# Patient Record
Sex: FEMALE | Race: BLACK OR AFRICAN AMERICAN | NOT HISPANIC OR LATINO | Employment: UNEMPLOYED | URBAN - NONMETROPOLITAN AREA
[De-identification: names, ages, dates, MRNs, and addresses within clinical notes are randomized per-mention and may not be internally consistent; named-entity substitution may affect disease eponyms.]

---

## 2016-08-09 LAB
CBC, PLATELET CT, AND DIFF: (no result)
EXTERNAL ABO GROUPING: (no result)
EXTERNAL ANTIBODY SCREEN: NORMAL
EXTERNAL HEPATITIS B SURFACE ANTIGEN: NEGATIVE
EXTERNAL RH FACTOR: POSITIVE
EXTERNAL RUBELLA QUALITATIVE: (no result)
EXTERNAL SYPHILIS RPR SCREEN: (no result)
HIV1 AB SPEC QL IA.RAPID: NEGATIVE

## 2016-10-31 LAB
EXTERNAL GTT 1 HOUR: 125
EXTERNAL HEMATOCRIT: 34 %
EXTERNAL HEMOGLOBIN: 11.6 G/DL

## 2017-01-04 VITALS
BODY MASS INDEX: 42.6 KG/M2 | WEIGHT: 217 LBS | HEIGHT: 60 IN | SYSTOLIC BLOOD PRESSURE: 122 MMHG | DIASTOLIC BLOOD PRESSURE: 62 MMHG

## 2017-01-04 PROBLEM — Z34.90 PREGNANCY: Status: ACTIVE | Noted: 2017-01-04

## 2017-01-04 RX ORDER — PROMETHAZINE HYDROCHLORIDE 25 MG/1
25 TABLET ORAL EVERY 6 HOURS PRN
COMMUNITY
End: 2017-03-08

## 2017-01-06 ENCOUNTER — CONVERSION ENCOUNTER (OUTPATIENT)
Dept: OBSTETRICS AND GYNECOLOGY | Facility: CLINIC | Age: 22
End: 2017-01-06

## 2017-01-06 ENCOUNTER — ROUTINE PRENATAL (OUTPATIENT)
Dept: OBSTETRICS AND GYNECOLOGY | Facility: CLINIC | Age: 22
End: 2017-01-06

## 2017-01-06 VITALS — SYSTOLIC BLOOD PRESSURE: 116 MMHG | WEIGHT: 217 LBS | DIASTOLIC BLOOD PRESSURE: 70 MMHG | BODY MASS INDEX: 42.38 KG/M2

## 2017-01-06 DIAGNOSIS — A49.1 GROUP B STREPTOCOCCAL INFECTION: Primary | ICD-10-CM

## 2017-01-06 DIAGNOSIS — Z3A.36 36 WEEKS GESTATION OF PREGNANCY: ICD-10-CM

## 2017-01-06 PROCEDURE — 99212 OFFICE O/P EST SF 10 MIN: CPT | Performed by: OBSTETRICS & GYNECOLOGY

## 2017-01-06 NOTE — MR AVS SNAPSHOT
Bessie Westfall   1/6/2017 3:00 PM   Routine Prenatal    Dept Phone:  [phone]   Encounter #:  [id number]    Provider:  Micky Andino MD   Department:  [facility] OBSTETRICS AND GYNECOLOGY                Your Full Care Plan              Your Updated Medication List          This list is accurate as of: 1/6/17  3:56 PM.  Always use your most recent med list.                CLARITIN-D 12 HOUR 5-120 MG per 12 hr tablet   Generic drug:  loratadine-pseudoephedrine       hydrocortisone 2.5 % cream       promethazine 25 MG tablet   Commonly known as:  PHENERGAN               We Performed the Following     Group B Strep Culture       You Were Diagnosed With        Codes Comments    Group B streptococcal infection    -  Primary ICD-10-CM: A49.1  ICD-9-CM: 041.02     36 weeks gestation of pregnancy     ICD-10-CM: Z3A.36  ICD-9-CM: V22.2       Instructions    Third Trimester of Pregnancy  The third trimester is from week 29 through week 42, months 7 through 9. The third trimester is a time when the fetus is growing rapidly. At the end of the ninth month, the fetus is about 20 inches in length and weighs 6-10 pounds.   BODY CHANGES  Your body goes through many changes during pregnancy. The changes vary from woman to woman.   · Your weight will continue to increase. You can expect to gain 25-35 pounds (11-16 kg) by the end of the pregnancy.  · You may begin to get stretch marks on your hips, abdomen, and breasts.  · You may urinate more often because the fetus is moving lower into your pelvis and pressing on your bladder.  · You may develop or continue to have heartburn as a result of your pregnancy.  · You may develop constipation because certain hormones are causing the muscles that push waste through your intestines to slow down.  · You may develop hemorrhoids or swollen, bulging veins (varicose veins).  · You may have pelvic pain because of the weight gain and  "pregnancy hormones relaxing your joints between the bones in your pelvis. Backaches may result from overexertion of the muscles supporting your posture.  · You may have changes in your hair. These can include thickening of your hair, rapid growth, and changes in texture. Some women also have hair loss during or after pregnancy, or hair that feels dry or thin. Your hair will most likely return to normal after your baby is born.  · Your breasts will continue to grow and be tender. A yellow discharge may leak from your breasts called colostrum.  · Your belly button may stick out.  · You may feel short of breath because of your expanding uterus.  · You may notice the fetus \"dropping,\" or moving lower in your abdomen.  · You may have a bloody mucus discharge. This usually occurs a few days to a week before labor begins.  · Your cervix becomes thin and soft (effaced) near your due date.  WHAT TO EXPECT AT YOUR PRENATAL EXAMS   You will have prenatal exams every 2 weeks until week 36. Then, you will have weekly prenatal exams. During a routine prenatal visit:  · You will be weighed to make sure you and the fetus are growing normally.  · Your blood pressure is taken.  · Your abdomen will be measured to track your baby's growth.  · The fetal heartbeat will be listened to.  · Any test results from the previous visit will be discussed.  · You may have a cervical check near your due date to see if you have effaced.  At around 36 weeks, your caregiver will check your cervix. At the same time, your caregiver will also perform a test on the secretions of the vaginal tissue. This test is to determine if a type of bacteria, Group B streptococcus, is present. Your caregiver will explain this further.  Your caregiver may ask you:  · What your birth plan is.  · How you are feeling.  · If you are feeling the baby move.  · If you have had any abnormal symptoms, such as leaking fluid, bleeding, severe headaches, or abdominal " cramping.  · If you are using any tobacco products, including cigarettes, chewing tobacco, and electronic cigarettes.  · If you have any questions.  Other tests or screenings that may be performed during your third trimester include:  · Blood tests that check for low iron levels (anemia).  · Fetal testing to check the health, activity level, and growth of the fetus. Testing is done if you have certain medical conditions or if there are problems during the pregnancy.  · HIV (human immunodeficiency virus) testing. If you are at high risk, you may be screened for HIV during your third trimester of pregnancy.  FALSE LABOR  You may feel small, irregular contractions that eventually go away. These are called Roney Ratliff contractions, or false labor. Contractions may last for hours, days, or even weeks before true labor sets in. If contractions come at regular intervals, intensify, or become painful, it is best to be seen by your caregiver.   SIGNS OF LABOR   · Menstrual-like cramps.  · Contractions that are 5 minutes apart or less.  · Contractions that start on the top of the uterus and spread down to the lower abdomen and back.  · A sense of increased pelvic pressure or back pain.  · A watery or bloody mucus discharge that comes from the vagina.  If you have any of these signs before the 37th week of pregnancy, call your caregiver right away. You need to go to the hospital to get checked immediately.  HOME CARE INSTRUCTIONS   · Avoid all smoking, herbs, alcohol, and unprescribed drugs. These chemicals affect the formation and growth of the baby.  · Do not use any tobacco products, including cigarettes, chewing tobacco, and electronic cigarettes. If you need help quitting, ask your health care provider. You may receive counseling support and other resources to help you quit.  · Follow your caregiver's instructions regarding medicine use. There are medicines that are either safe or unsafe to take during  pregnancy.  · Exercise only as directed by your caregiver. Experiencing uterine cramps is a good sign to stop exercising.  · Continue to eat regular, healthy meals.  · Wear a good support bra for breast tenderness.  · Do not use hot tubs, steam rooms, or saunas.  · Wear your seat belt at all times when driving.  · Avoid raw meat, uncooked cheese, cat litter boxes, and soil used by cats. These carry germs that can cause birth defects in the baby.  · Take your prenatal vitamins.  · Take 5724-1252 mg of calcium daily starting at the 20th week of pregnancy until you deliver your baby.  · Try taking a stool softener (if your caregiver approves) if you develop constipation. Eat more high-fiber foods, such as fresh vegetables or fruit and whole grains. Drink plenty of fluids to keep your urine clear or pale yellow.  · Take warm sitz baths to soothe any pain or discomfort caused by hemorrhoids. Use hemorrhoid cream if your caregiver approves.  · If you develop varicose veins, wear support hose. Elevate your feet for 15 minutes, 3-4 times a day. Limit salt in your diet.  · Avoid heavy lifting, wear low heal shoes, and practice good posture.  · Rest a lot with your legs elevated if you have leg cramps or low back pain.  · Visit your dentist if you have not gone during your pregnancy. Use a soft toothbrush to brush your teeth and be gentle when you floss.  · A sexual relationship may be continued unless your caregiver directs you otherwise.  · Do not travel far distances unless it is absolutely necessary and only with the approval of your caregiver.  · Take prenatal classes to understand, practice, and ask questions about the labor and delivery.  · Make a trial run to the hospital.  · Pack your hospital bag.  · Prepare the baby's nursery.  · Continue to go to all your prenatal visits as directed by your caregiver.  SEEK MEDICAL CARE IF:  · You are unsure if you are in labor or if your water has broken.  · You have  dizziness.  · You have mild pelvic cramps, pelvic pressure, or nagging pain in your abdominal area.  · You have persistent nausea, vomiting, or diarrhea.  · You have a bad smelling vaginal discharge.  · You have pain with urination.  SEEK IMMEDIATE MEDICAL CARE IF:   · You have a fever.  · You are leaking fluid from your vagina.  · You have spotting or bleeding from your vagina.  · You have severe abdominal cramping or pain.  · You have rapid weight loss or gain.  · You have shortness of breath with chest pain.  · You notice sudden or extreme swelling of your face, hands, ankles, feet, or legs.  · You have not felt your baby move in over an hour.  · You have severe headaches that do not go away with medicine.  · You have vision changes.     This information is not intended to replace advice given to you by your health care provider. Make sure you discuss any questions you have with your health care provider.     Document Released: 2002 Document Revised: 2016 Document Reviewed: 2014  [employer] Interactive Patient Education  [employer]       Patient Instructions History      Upcoming Appointments     Visit Type Date Time Department    OB FOLLOWUP 2017  3:00 PM [facility]    [facility]      [facility] Signup     [facility] [employer] allows you to send messages to your doctor, view your test results, renew your prescriptions, schedule appointments, and more. To sign up, go to [employer] and click on the Sign Up Now link in the New User? box. Enter your [employer] Activation Code exactly as it appears below along with the last four digits of your Social Security Number and your Date of Birth () to complete the sign-up process. If you do not sign up before the expiration date, you must request a new code.    [employer] Activation Code: [email or web address]  Expires: 2017  3:55 PM    If you have questions, you can email  [email or web address] or call [phone] to talk to our MyChart staff. Remember, [employer] is NOT to be used for urgent needs. For medical emergencies, dial 911.               Other Info from Your Visit           Your Appointments     Jan 11, 2017 11:40 AM EST   OB FOLLOWUP with Micky Andino MD   [facility] OBSTETRICS [facility] GYNECOLOGY (--)    [address] [address] [phone]              Allergies     No Known Allergies      Reason for Visit     Routine Prenatal Visit           Vital Signs     Blood Pressure Weight Last Menstrual Period Body Mass Index Smoking Status       116/70 217 lb (98.4 kg) (LMP Unknown) 42.38 kg/m2 Former Smoker       Problems and Diagnoses Noted     Pregnancy    Infection due to Streptococcus bacteria    -  Primary

## 2017-01-06 NOTE — PATIENT INSTRUCTIONS
"Third Trimester of Pregnancy  The third trimester is from week 29 through week 42, months 7 through 9. The third trimester is a time when the fetus is growing rapidly. At the end of the ninth month, the fetus is about 20 inches in length and weighs 6-10 pounds.   BODY CHANGES  Your body goes through many changes during pregnancy. The changes vary from woman to woman.   · Your weight will continue to increase. You can expect to gain 25-35 pounds (11-16 kg) by the end of the pregnancy.  · You may begin to get stretch marks on your hips, abdomen, and breasts.  · You may urinate more often because the fetus is moving lower into your pelvis and pressing on your bladder.  · You may develop or continue to have heartburn as a result of your pregnancy.  · You may develop constipation because certain hormones are causing the muscles that push waste through your intestines to slow down.  · You may develop hemorrhoids or swollen, bulging veins (varicose veins).  · You may have pelvic pain because of the weight gain and pregnancy hormones relaxing your joints between the bones in your pelvis. Backaches may result from overexertion of the muscles supporting your posture.  · You may have changes in your hair. These can include thickening of your hair, rapid growth, and changes in texture. Some women also have hair loss during or after pregnancy, or hair that feels dry or thin. Your hair will most likely return to normal after your baby is born.  · Your breasts will continue to grow and be tender. A yellow discharge may leak from your breasts called colostrum.  · Your belly button may stick out.  · You may feel short of breath because of your expanding uterus.  · You may notice the fetus \"dropping,\" or moving lower in your abdomen.  · You may have a bloody mucus discharge. This usually occurs a few days to a week before labor begins.  · Your cervix becomes thin and soft (effaced) near your due date.  WHAT TO EXPECT AT YOUR PRENATAL " EXAMS   You will have prenatal exams every 2 weeks until week 36. Then, you will have weekly prenatal exams. During a routine prenatal visit:  · You will be weighed to make sure you and the fetus are growing normally.  · Your blood pressure is taken.  · Your abdomen will be measured to track your baby's growth.  · The fetal heartbeat will be listened to.  · Any test results from the previous visit will be discussed.  · You may have a cervical check near your due date to see if you have effaced.  At around 36 weeks, your caregiver will check your cervix. At the same time, your caregiver will also perform a test on the secretions of the vaginal tissue. This test is to determine if a type of bacteria, Group B streptococcus, is present. Your caregiver will explain this further.  Your caregiver may ask you:  · What your birth plan is.  · How you are feeling.  · If you are feeling the baby move.  · If you have had any abnormal symptoms, such as leaking fluid, bleeding, severe headaches, or abdominal cramping.  · If you are using any tobacco products, including cigarettes, chewing tobacco, and electronic cigarettes.  · If you have any questions.  Other tests or screenings that may be performed during your third trimester include:  · Blood tests that check for low iron levels (anemia).  · Fetal testing to check the health, activity level, and growth of the fetus. Testing is done if you have certain medical conditions or if there are problems during the pregnancy.  · HIV (human immunodeficiency virus) testing. If you are at high risk, you may be screened for HIV during your third trimester of pregnancy.  FALSE LABOR  You may feel small, irregular contractions that eventually go away. These are called Roney Ratliff contractions, or false labor. Contractions may last for hours, days, or even weeks before true labor sets in. If contractions come at regular intervals, intensify, or become painful, it is best to be seen by your  caregiver.   SIGNS OF LABOR   · Menstrual-like cramps.  · Contractions that are 5 minutes apart or less.  · Contractions that start on the top of the uterus and spread down to the lower abdomen and back.  · A sense of increased pelvic pressure or back pain.  · A watery or bloody mucus discharge that comes from the vagina.  If you have any of these signs before the 37th week of pregnancy, call your caregiver right away. You need to go to the hospital to get checked immediately.  HOME CARE INSTRUCTIONS   · Avoid all smoking, herbs, alcohol, and unprescribed drugs. These chemicals affect the formation and growth of the baby.  · Do not use any tobacco products, including cigarettes, chewing tobacco, and electronic cigarettes. If you need help quitting, ask your health care provider. You may receive counseling support and other resources to help you quit.  · Follow your caregiver's instructions regarding medicine use. There are medicines that are either safe or unsafe to take during pregnancy.  · Exercise only as directed by your caregiver. Experiencing uterine cramps is a good sign to stop exercising.  · Continue to eat regular, healthy meals.  · Wear a good support bra for breast tenderness.  · Do not use hot tubs, steam rooms, or saunas.  · Wear your seat belt at all times when driving.  · Avoid raw meat, uncooked cheese, cat litter boxes, and soil used by cats. These carry germs that can cause birth defects in the baby.  · Take your prenatal vitamins.  · Take 0018-5834 mg of calcium daily starting at the 20th week of pregnancy until you deliver your baby.  · Try taking a stool softener (if your caregiver approves) if you develop constipation. Eat more high-fiber foods, such as fresh vegetables or fruit and whole grains. Drink plenty of fluids to keep your urine clear or pale yellow.  · Take warm sitz baths to soothe any pain or discomfort caused by hemorrhoids. Use hemorrhoid cream if your caregiver approves.  · If  you develop varicose veins, wear support hose. Elevate your feet for 15 minutes, 3-4 times a day. Limit salt in your diet.  · Avoid heavy lifting, wear low heal shoes, and practice good posture.  · Rest a lot with your legs elevated if you have leg cramps or low back pain.  · Visit your dentist if you have not gone during your pregnancy. Use a soft toothbrush to brush your teeth and be gentle when you floss.  · A sexual relationship may be continued unless your caregiver directs you otherwise.  · Do not travel far distances unless it is absolutely necessary and only with the approval of your caregiver.  · Take prenatal classes to understand, practice, and ask questions about the labor and delivery.  · Make a trial run to the hospital.  · Pack your hospital bag.  · Prepare the baby's nursery.  · Continue to go to all your prenatal visits as directed by your caregiver.  SEEK MEDICAL CARE IF:  · You are unsure if you are in labor or if your water has broken.  · You have dizziness.  · You have mild pelvic cramps, pelvic pressure, or nagging pain in your abdominal area.  · You have persistent nausea, vomiting, or diarrhea.  · You have a bad smelling vaginal discharge.  · You have pain with urination.  SEEK IMMEDIATE MEDICAL CARE IF:   · You have a fever.  · You are leaking fluid from your vagina.  · You have spotting or bleeding from your vagina.  · You have severe abdominal cramping or pain.  · You have rapid weight loss or gain.  · You have shortness of breath with chest pain.  · You notice sudden or extreme swelling of your face, hands, ankles, feet, or legs.  · You have not felt your baby move in over an hour.  · You have severe headaches that do not go away with medicine.  · You have vision changes.     This information is not intended to replace advice given to you by your health care provider. Make sure you discuss any questions you have with your health care provider.     Document Released: 12/12/2002 Document  Revised: 01/08/2016 Document Reviewed: 02/18/2014  Elsevier Interactive Patient Education ©2016 Elsevier Inc.

## 2017-01-11 ENCOUNTER — ROUTINE PRENATAL (OUTPATIENT)
Dept: OBSTETRICS AND GYNECOLOGY | Facility: CLINIC | Age: 22
End: 2017-01-11

## 2017-01-11 VITALS — SYSTOLIC BLOOD PRESSURE: 126 MMHG | DIASTOLIC BLOOD PRESSURE: 68 MMHG | WEIGHT: 219 LBS | BODY MASS INDEX: 42.77 KG/M2

## 2017-01-11 DIAGNOSIS — Z3A.36 36 WEEKS GESTATION OF PREGNANCY: Primary | ICD-10-CM

## 2017-01-11 DIAGNOSIS — Z34.93 NORMAL PREGNANCY, THIRD TRIMESTER: ICD-10-CM

## 2017-01-11 LAB
GLUCOSE UR STRIP-MCNC: NEGATIVE MG/DL
NITRITE UR-MCNC: NEGATIVE MG/ML
PROT UR STRIP-MCNC: NEGATIVE MG/DL
RBC # UR STRIP: NEGATIVE /UL

## 2017-01-11 PROCEDURE — 99213 OFFICE O/P EST LOW 20 MIN: CPT | Performed by: NURSE PRACTITIONER

## 2017-01-11 RX ORDER — AZITHROMYCIN 250 MG/1
TABLET, FILM COATED ORAL
Qty: 1 TABLET | Refills: 0 | Status: SHIPPED | OUTPATIENT
Start: 2017-01-11 | End: 2017-01-29 | Stop reason: HOSPADM

## 2017-01-11 NOTE — PROGRESS NOTES
C/o nausea - no vomiting - back pain - some cramps  - c/o sore throat / sinusitis -  Good FM  O.  See flow sheet  -   Back neg CVAT  Abd -soft and non-tender  + FHT  P.  Option for phenergan - declined - enc bland diet /adeq fluids - Z-pack  -  Cont to note good FM,     S/S labor  Call prn

## 2017-01-11 NOTE — MR AVS SNAPSHOT
Bessie Westfall   1/11/2017 1:00 PM   Routine Prenatal    Dept Phone:  [phone]   Encounter #:  [id number]    Provider:  Radha Lobato CNM   Department:  [facility] OBSTETRICS AND GYNECOLOGY                Your Full Care Plan              Today's Medication Changes          These changes are accurate as of: 1/11/17  2:13 PM.  If you have any questions, ask your nurse or doctor.               New Medication(s)Ordered:     azithromycin 250 MG tablet   Commonly known as:  ZITHROMAX Z-SHARA   Take 2 tablets (500 mg) on  Day 1,  followed by 1 tablet (250 mg) once daily on Days 2 through 5.   Started by:  Radha Lobato CNM            Where to Get Your Medications      These medications were sent to [address] - [address] [facility] - [phone]  - [phone] [address] [address]     Phone:  [phone]     azithromycin 250 MG tablet                  Your Updated Medication List          This list is accurate as of: 1/11/17  2:13 PM.  Always use your most recent med list.                azithromycin 250 MG tablet   Commonly known as:  ZITHROMAX Z-SHARA   Take 2 tablets (500 mg) on  Day 1,  followed by 1 tablet (250 mg) once daily on Days 2 through 5.       CLARITIN-D 12 HOUR 5-120 MG per 12 hr tablet   Generic drug:  loratadine-pseudoephedrine       hydrocortisone 2.5 % cream       promethazine 25 MG tablet   Commonly known as:  PHENERGAN               We Performed the Following     POC Urinalysis Dipstick       You Were Diagnosed With        Codes Comments    36 weeks gestation of pregnancy    -  Primary ICD-10-CM: Z3A.36  ICD-9-CM: V22.2     Normal pregnancy, third trimester     ICD-10-CM: Z34.93  ICD-9-CM: V22.2       Instructions     None    Patient Instructions History      Upcoming Appointments     Visit Type Date Time Department    OB FOLLOWUP 1/11/2017  1:00 PM MIRIAN LIRA    OB FOLLOWUP  2017  9:00 AM [facility]      [facility] Signup     [facility] [employer] allows you to send messages to your doctor, view your test results, renew your prescriptions, schedule appointments, and more. To sign up, go to [employer] and click on the Sign Up Now link in the New User? box. Enter your [employer] Activation Code exactly as it appears below along with the last four digits of your Social Security Number and your Date of Birth () to complete the sign-up process. If you do not sign up before the expiration date, you must request a new code.    [employer] Activation Code: [email or web address]  Expires: 2017  3:55 PM    If you have questions, you can email [email or web address] or call [phone] to talk to our [employer] staff. Remember, [employer] is NOT to be used for urgent needs. For medical emergencies, dial 911.               Other Info from Your Visit           Your Appointments     2017  9:00 AM EST   OB FOLLOWUP with Radha Lobato CNM   [facility] (--)    [address] [address] [phone]              Allergies     No Known Allergies      Reason for Visit     Routine Prenatal Visit HA, NAUSEA, VOMITING, DIZZINESS, SWELLING, BURNING SENSATION IN ABDOMEN      Vital Signs     Blood Pressure Weight Last Menstrual Period Body Mass Index Smoking Status       126/68 219 lb (99.3 kg) (LMP Unknown) 42.77 kg/m2 Former Smoker       Problems and Diagnoses Noted     Pregnancy    Normal pregnancy, third trimester          Results     POC Urinalysis Dipstick      Component Value Standard Range & Units    Glucose, UA Negative Negative, 1000 mg/dL (3+) mg/dL    Blood, UA Negative Negative    Protein, POC Negative Negative mg/dL    Nitrite, UA Negative Negative

## 2017-01-13 LAB — B-HEM STREP SPEC QL CULT: NEGATIVE

## 2017-01-16 ENCOUNTER — HOSPITAL ENCOUNTER (OUTPATIENT)
Dept: OTHER | Facility: HOSPITAL | Age: 22
Discharge: HOME | End: 2017-01-16
Attending: OBSTETRICS & GYNECOLOGY

## 2017-01-18 ENCOUNTER — PREP FOR SURGERY (OUTPATIENT)
Dept: OBSTETRICS AND GYNECOLOGY | Facility: CLINIC | Age: 22
End: 2017-01-18

## 2017-01-18 ENCOUNTER — RESULTS ENCOUNTER (OUTPATIENT)
Dept: OBSTETRICS AND GYNECOLOGY | Facility: CLINIC | Age: 22
End: 2017-01-18

## 2017-01-18 DIAGNOSIS — O34.219 PREGNANCY WITH HISTORY OF CESAREAN SECTION, ANTEPARTUM: ICD-10-CM

## 2017-01-18 DIAGNOSIS — Z30.2 ENCOUNTER FOR STERILIZATION: Primary | ICD-10-CM

## 2017-01-18 DIAGNOSIS — Z30.2 ENCOUNTER FOR STERILIZATION: ICD-10-CM

## 2017-01-18 RX ORDER — ONDANSETRON 4 MG/1
4 TABLET, ORALLY DISINTEGRATING ORAL EVERY 6 HOURS PRN
Status: CANCELLED | OUTPATIENT
Start: 2017-01-18

## 2017-01-18 RX ORDER — LIDOCAINE HYDROCHLORIDE 10 MG/ML
5 INJECTION, SOLUTION INFILTRATION; PERINEURAL AS NEEDED
Status: CANCELLED | OUTPATIENT
Start: 2017-01-18

## 2017-01-18 RX ORDER — CARBOPROST TROMETHAMINE 250 UG/ML
250 INJECTION, SOLUTION INTRAMUSCULAR AS NEEDED
Status: CANCELLED | OUTPATIENT
Start: 2017-01-18

## 2017-01-18 RX ORDER — ONDANSETRON 4 MG/1
4 TABLET, FILM COATED ORAL EVERY 6 HOURS PRN
Status: CANCELLED | OUTPATIENT
Start: 2017-01-18

## 2017-01-18 RX ORDER — ONDANSETRON 2 MG/ML
4 INJECTION INTRAMUSCULAR; INTRAVENOUS EVERY 6 HOURS PRN
Status: CANCELLED | OUTPATIENT
Start: 2017-01-18

## 2017-01-18 RX ORDER — PROMETHAZINE HYDROCHLORIDE 25 MG/ML
12.5 INJECTION, SOLUTION INTRAMUSCULAR; INTRAVENOUS EVERY 6 HOURS PRN
Status: CANCELLED | OUTPATIENT
Start: 2017-01-18

## 2017-01-18 RX ORDER — PROMETHAZINE HYDROCHLORIDE 25 MG/1
12.5 SUPPOSITORY RECTAL EVERY 6 HOURS PRN
Status: CANCELLED | OUTPATIENT
Start: 2017-01-18

## 2017-01-18 RX ORDER — SODIUM CHLORIDE, SODIUM LACTATE, POTASSIUM CHLORIDE, CALCIUM CHLORIDE 600; 310; 30; 20 MG/100ML; MG/100ML; MG/100ML; MG/100ML
125 INJECTION, SOLUTION INTRAVENOUS CONTINUOUS
Status: CANCELLED | OUTPATIENT
Start: 2017-01-18

## 2017-01-18 RX ORDER — ZOLPIDEM TARTRATE 5 MG/1
10 TABLET ORAL NIGHTLY PRN
Status: CANCELLED | OUTPATIENT
Start: 2017-01-18 | End: 2017-01-28

## 2017-01-18 RX ORDER — METHYLERGONOVINE MALEATE 0.2 MG/ML
200 INJECTION INTRAVENOUS ONCE AS NEEDED
Status: CANCELLED | OUTPATIENT
Start: 2017-01-18

## 2017-01-18 RX ORDER — OXYTOCIN/RINGER'S LACTATE 20/1000 ML
20 PLASTIC BAG, INJECTION (ML) INTRAVENOUS CONTINUOUS
Status: CANCELLED | OUTPATIENT
Start: 2017-01-18

## 2017-01-18 RX ORDER — MORPHINE SULFATE 10 MG/ML
4 INJECTION, SOLUTION INTRAMUSCULAR; INTRAVENOUS
Status: CANCELLED | OUTPATIENT
Start: 2017-01-18 | End: 2017-01-28

## 2017-01-18 RX ORDER — SODIUM CHLORIDE 0.9 % (FLUSH) 0.9 %
1-10 SYRINGE (ML) INJECTION AS NEEDED
Status: CANCELLED | OUTPATIENT
Start: 2017-01-18

## 2017-01-18 RX ORDER — MISOPROSTOL 100 UG/1
800 TABLET ORAL AS NEEDED
Status: CANCELLED | OUTPATIENT
Start: 2017-01-18

## 2017-01-18 RX ORDER — PROMETHAZINE HYDROCHLORIDE 25 MG/1
12.5 TABLET ORAL EVERY 6 HOURS PRN
Status: CANCELLED | OUTPATIENT
Start: 2017-01-18

## 2017-01-20 ENCOUNTER — ROUTINE PRENATAL (OUTPATIENT)
Dept: OBSTETRICS AND GYNECOLOGY | Facility: CLINIC | Age: 22
End: 2017-01-20

## 2017-01-20 VITALS — DIASTOLIC BLOOD PRESSURE: 70 MMHG | SYSTOLIC BLOOD PRESSURE: 128 MMHG | BODY MASS INDEX: 44.14 KG/M2 | WEIGHT: 226 LBS

## 2017-01-20 DIAGNOSIS — Z34.93 NORMAL PREGNANCY, THIRD TRIMESTER: Primary | ICD-10-CM

## 2017-01-20 PROCEDURE — 99212 OFFICE O/P EST SF 10 MIN: CPT | Performed by: NURSE PRACTITIONER

## 2017-01-20 RX ORDER — PRENATAL VIT NO.126/IRON/FOLIC 28MG-0.8MG
TABLET ORAL DAILY
COMMUNITY
End: 2019-10-06

## 2017-01-20 NOTE — MR AVS SNAPSHOT
Bessie Westfall   2017 9:00 AM   Routine Prenatal    Dept Phone:  [phone]   Encounter #:  [id number]    Provider:  Radha Lobato CNM   Department:  [facility] OBSTETRICS AND GYNECOLOGY                Your Full Care Plan              Your Updated Medication List          This list is accurate as of: 17  9:37 AM.  Always use your most recent med list.                azithromycin 250 MG tablet   Commonly known as:  ZITHROMAX Z-SHARA   Take 2 tablets (500 mg) on  Day 1,  followed by 1 tablet (250 mg) once daily on Days 2 through 5.       CLARITIN-D 12 HOUR 5-120 MG per 12 hr tablet   Generic drug:  loratadine-pseudoephedrine       hydrocortisone 2.5 % cream       prenatal (CLASSIC) vitamin 28-0.8 MG tablet tablet       promethazine 25 MG tablet   Commonly known as:  PHENERGAN               You Were Diagnosed With        Codes Comments    Normal pregnancy, third trimester    -  Primary ICD-10-CM: Z34.93  ICD-9-CM: V22.2       Instructions     None    Patient Instructions History      Upcoming Appointments     Visit Type Date Time Department    OB FOLLOWUP 2017  9:00 AM [facility]    OB FOLLOWUP 2017  9:50 AM [facility]RET [facility]      [facility] Signup     [facility] [employer] allows you to send messages to your doctor, view your test results, renew your prescriptions, schedule appointments, and more. To sign up, go to [employer] and click on the Sign Up Now link in the New User? box. Enter your [employer] Activation Code exactly as it appears below along with the last four digits of your Social Security Number and your Date of Birth () to complete the sign-up process. If you do not sign up before the expiration date, you must request a new code.    [employer] Activation Code: [email or web address]  Expires: 2017  3:55 PM    If you have questions, you can email [email or web address] or call [phone] to talk to our  MyChart staff. Remember, [employer] is NOT to be used for urgent needs. For medical emergencies, dial 911.               Other Info from Your Visit           Your Appointments     Jan 25, 2017  9:50 AM EST   OB FOLLOWUP with Radha Lobato CNM   [facility] OBSTETR[facility]YNECOLOGY (--)    [address] [address] [phone]              Allergies     No Known Allergies      Vital Signs     Blood Pressure Weight Last Menstrual Period Body Mass Index Smoking Status       128/70 226 lb (103 kg) (LMP Unknown) 44.14 kg/m2 Former Smoker       Problems and Diagnoses Noted     Normal pregnancy, third trimester    -  Primary

## 2017-01-20 NOTE — PROGRESS NOTES
No c/o     HPI  , 38w1d reports no c/o  - good FM    ROS  Visit Vitals   • /70   • Wt 226 lb (103 kg)   • LMP  (LMP Unknown)   • BMI 44.14 kg/m2    -See Prenatal Assessment    EXAM  Constitutional: alert   HEENT:  Heart/Lungs:  Abdomen:  Fundal ht/ FHT's / FM see prenatal flow sheet  Extremeites: MDM  Assessment/Plan  Labs Reviewed: u/a,  Labs Ordered:   No orders of the defined types were placed in this encounter.    U/S:    Treatment Plan: cont to note good FM - kick counts - S/S labor and adeq FM - here  C/S next wk   Requested Prescriptions      No prescriptions requested or ordered in this encounter     Referral:  Additional Workup:

## 2017-01-23 ENCOUNTER — RESULTS ENCOUNTER (OUTPATIENT)
Dept: OBSTETRICS AND GYNECOLOGY | Facility: CLINIC | Age: 22
End: 2017-01-23

## 2017-01-23 DIAGNOSIS — Z30.2 ENCOUNTER FOR STERILIZATION: ICD-10-CM

## 2017-01-23 DIAGNOSIS — O34.219 PREGNANCY WITH HISTORY OF CESAREAN SECTION, ANTEPARTUM: ICD-10-CM

## 2017-01-25 ENCOUNTER — APPOINTMENT (OUTPATIENT)
Dept: LAB | Facility: HOSPITAL | Age: 22
End: 2017-01-25
Attending: OBSTETRICS & GYNECOLOGY

## 2017-01-25 ENCOUNTER — ANESTHESIA EVENT (OUTPATIENT)
Dept: PERIOP | Facility: HOSPITAL | Age: 22
End: 2017-01-25

## 2017-01-25 ENCOUNTER — RESULTS ENCOUNTER (OUTPATIENT)
Dept: OBSTETRICS AND GYNECOLOGY | Facility: CLINIC | Age: 22
End: 2017-01-25

## 2017-01-25 ENCOUNTER — ROUTINE PRENATAL (OUTPATIENT)
Dept: OBSTETRICS AND GYNECOLOGY | Facility: CLINIC | Age: 22
End: 2017-01-25

## 2017-01-25 ENCOUNTER — LAB (OUTPATIENT)
Dept: LAB | Facility: HOSPITAL | Age: 22
End: 2017-01-25
Attending: OBSTETRICS & GYNECOLOGY

## 2017-01-25 VITALS — WEIGHT: 228 LBS | DIASTOLIC BLOOD PRESSURE: 60 MMHG | SYSTOLIC BLOOD PRESSURE: 122 MMHG | BODY MASS INDEX: 44.53 KG/M2

## 2017-01-25 DIAGNOSIS — Z3A.39 39 WEEKS GESTATION OF PREGNANCY: Primary | ICD-10-CM

## 2017-01-25 DIAGNOSIS — Z34.93 NORMAL PREGNANCY, THIRD TRIMESTER: Primary | ICD-10-CM

## 2017-01-25 DIAGNOSIS — Z3A.39 39 WEEKS GESTATION OF PREGNANCY: ICD-10-CM

## 2017-01-25 DIAGNOSIS — Z30.2 ENCOUNTER FOR STERILIZATION: ICD-10-CM

## 2017-01-25 DIAGNOSIS — O34.219 PREGNANCY WITH HISTORY OF CESAREAN SECTION, ANTEPARTUM: ICD-10-CM

## 2017-01-25 LAB
ABO GROUP BLD: NORMAL
BASOPHILS # BLD AUTO: 0.03 10*3/MM3 (ref 0–0.2)
BASOPHILS NFR BLD AUTO: 0.4 % (ref 0–2.5)
BILIRUB UR QL STRIP: NEGATIVE
BLD GP AB SCN SERPL QL: NEGATIVE
CLARITY UR: ABNORMAL
COLOR UR: ABNORMAL
DEPRECATED RDW RBC AUTO: 48 FL (ref 37–54)
EOSINOPHIL # BLD AUTO: 0.1 10*3/MM3 (ref 0–0.7)
EOSINOPHIL NFR BLD AUTO: 1.4 % (ref 0–7)
ERYTHROCYTE [DISTWIDTH] IN BLOOD BY AUTOMATED COUNT: 14.6 % (ref 11.5–14.5)
GLUCOSE UR STRIP-MCNC: NEGATIVE MG/DL
HCT VFR BLD AUTO: 33.6 % (ref 37–47)
HGB BLD-MCNC: 10.8 G/DL (ref 12–16)
HGB UR QL STRIP.AUTO: NEGATIVE
IMM GRANULOCYTES # BLD: 0.04 10*3/MM3 (ref 0–0.06)
IMM GRANULOCYTES NFR BLD: 0.6 % (ref 0–0.6)
KETONES UR QL STRIP: NEGATIVE
LEUKOCYTE ESTERASE UR QL STRIP.AUTO: NEGATIVE
LYMPHOCYTES # BLD AUTO: 1.95 10*3/MM3 (ref 0.6–3.4)
LYMPHOCYTES NFR BLD AUTO: 27.8 % (ref 10–50)
MCH RBC QN AUTO: 29.3 PG (ref 27–31)
MCHC RBC AUTO-ENTMCNC: 32.1 G/DL (ref 30–37)
MCV RBC AUTO: 91.3 FL (ref 81–99)
MONOCYTES # BLD AUTO: 0.57 10*3/MM3 (ref 0–0.9)
MONOCYTES NFR BLD AUTO: 8.1 % (ref 0–12)
NEUTROPHILS # BLD AUTO: 4.33 10*3/MM3 (ref 2–6.9)
NEUTROPHILS NFR BLD AUTO: 61.7 % (ref 37–80)
NITRITE UR QL STRIP: NEGATIVE
NRBC BLD MANUAL-RTO: 0 /100 WBC (ref 0–0)
PH UR STRIP.AUTO: 6.5 [PH] (ref 5–8)
PLATELET # BLD AUTO: 250 10*3/MM3 (ref 130–400)
PMV BLD AUTO: 9.7 FL (ref 6–12)
PROT UR QL STRIP: ABNORMAL
RBC # BLD AUTO: 3.68 10*6/MM3 (ref 4.2–5.4)
RH BLD: POSITIVE
SP GR UR STRIP: 1.02 (ref 1–1.03)
UROBILINOGEN UR QL STRIP: ABNORMAL
WBC NRBC COR # BLD: 7.02 10*3/MM3 (ref 4.8–10.8)

## 2017-01-25 PROCEDURE — 99212 OFFICE O/P EST SF 10 MIN: CPT | Performed by: NURSE PRACTITIONER

## 2017-01-25 RX ORDER — ONDANSETRON 2 MG/ML
4 INJECTION INTRAMUSCULAR; INTRAVENOUS EVERY 6 HOURS PRN
Status: CANCELLED | OUTPATIENT
Start: 2017-01-25

## 2017-01-25 RX ORDER — MISOPROSTOL 100 UG/1
800 TABLET ORAL AS NEEDED
Status: CANCELLED | OUTPATIENT
Start: 2017-01-25

## 2017-01-25 RX ORDER — PROMETHAZINE HYDROCHLORIDE 25 MG/1
12.5 SUPPOSITORY RECTAL EVERY 6 HOURS PRN
Status: CANCELLED | OUTPATIENT
Start: 2017-01-25

## 2017-01-25 RX ORDER — ONDANSETRON 4 MG/1
4 TABLET, FILM COATED ORAL EVERY 6 HOURS PRN
Status: CANCELLED | OUTPATIENT
Start: 2017-01-25

## 2017-01-25 RX ORDER — MORPHINE SULFATE 10 MG/ML
4 INJECTION, SOLUTION INTRAMUSCULAR; INTRAVENOUS
Status: CANCELLED | OUTPATIENT
Start: 2017-01-25 | End: 2017-02-04

## 2017-01-25 RX ORDER — PROMETHAZINE HYDROCHLORIDE 25 MG/ML
12.5 INJECTION, SOLUTION INTRAMUSCULAR; INTRAVENOUS EVERY 6 HOURS PRN
Status: CANCELLED | OUTPATIENT
Start: 2017-01-25

## 2017-01-25 RX ORDER — ZOLPIDEM TARTRATE 5 MG/1
10 TABLET ORAL NIGHTLY PRN
Status: CANCELLED | OUTPATIENT
Start: 2017-01-25 | End: 2017-02-04

## 2017-01-25 RX ORDER — LIDOCAINE HYDROCHLORIDE 10 MG/ML
5 INJECTION, SOLUTION INFILTRATION; PERINEURAL AS NEEDED
Status: CANCELLED | OUTPATIENT
Start: 2017-01-25

## 2017-01-25 RX ORDER — PROMETHAZINE HYDROCHLORIDE 25 MG/1
12.5 TABLET ORAL EVERY 6 HOURS PRN
Status: CANCELLED | OUTPATIENT
Start: 2017-01-25

## 2017-01-25 RX ORDER — METHYLERGONOVINE MALEATE 0.2 MG/ML
200 INJECTION INTRAVENOUS ONCE AS NEEDED
Status: CANCELLED | OUTPATIENT
Start: 2017-01-25

## 2017-01-25 RX ORDER — ONDANSETRON 4 MG/1
4 TABLET, ORALLY DISINTEGRATING ORAL EVERY 6 HOURS PRN
Status: CANCELLED | OUTPATIENT
Start: 2017-01-25

## 2017-01-25 RX ORDER — SODIUM CHLORIDE, SODIUM LACTATE, POTASSIUM CHLORIDE, CALCIUM CHLORIDE 600; 310; 30; 20 MG/100ML; MG/100ML; MG/100ML; MG/100ML
125 INJECTION, SOLUTION INTRAVENOUS CONTINUOUS
Status: CANCELLED | OUTPATIENT
Start: 2017-01-25

## 2017-01-25 RX ORDER — CARBOPROST TROMETHAMINE 250 UG/ML
250 INJECTION, SOLUTION INTRAMUSCULAR AS NEEDED
Status: CANCELLED | OUTPATIENT
Start: 2017-01-25

## 2017-01-25 RX ORDER — SODIUM CHLORIDE 0.9 % (FLUSH) 0.9 %
1-10 SYRINGE (ML) INJECTION AS NEEDED
Status: CANCELLED | OUTPATIENT
Start: 2017-01-25

## 2017-01-25 NOTE — PROGRESS NOTES
Chief Complaint   Patient presents with   • Routine Prenatal Visit     no complaints, section tomorrow         HPI  , 38w6d reports ready for C/S tomorrow - good FM   ROS  Visit Vitals   • /60   • Wt 228 lb (103 kg)   • LMP  (LMP Unknown)   • BMI 44.53 kg/m2    -See Prenatal Assessment    EXAM  Constitutional: calm  HEENT:  Heart/Lungs:  Abdomen:  Fundal ht/ FHT's / FM see prenatal flow sheet  Extremeites:  Pt states minimal to none LE swelling - boots on at present    MDM  Assessment:  IUP term - desires rept c/s & BTL     Plan:  sched C/S  - informed re: long term BC - desires BTL          Labs Reviewed:    Labs/Treatment:   No orders of the defined types were placed in this encounter.    Requested Prescriptions      No prescriptions requested or ordered in this encounter

## 2017-01-25 NOTE — MR AVS SNAPSHOT
Bessie Westfall   2017 9:50 AM   Routine Prenatal    Dept Phone:  [phone]   Encounter #:  [id number]    Provider:  Radha Lobato CNM   Department:  [facility] OBSTETRICS [facility] GYNECOLOGY                Your Full Care Plan              Your Updated Medication List          This list is accurate as of: 17 10:30 AM.  Always use your most recent med list.                azithromycin 250 MG tablet   Commonly known as:  ZITHROMAX Z-SHARA   Take 2 tablets (500 mg) on  Day 1,  followed by 1 tablet (250 mg) once daily on Days 2 through 5.       CLARITIN-D 12 HOUR 5-120 MG per 12 hr tablet   Generic drug:  loratadine-pseudoephedrine       hydrocortisone 2.5 % cream       prenatal (CLASSIC) vitamin 28-0.8 MG tablet tablet       promethazine 25 MG tablet   Commonly known as:  PHENERGAN               You Were Diagnosed With        Codes Comments    Normal pregnancy, third trimester    -  Primary ICD-10-CM: Z34.93  ICD-9-CM: V22.2       Instructions     None    Patient Instructions History      Upcoming Appointments     Visit Type Date Time Department    OB FOLLOWUP 2017  9:50 AM [facility] BRET [facility]      Belly Signup     [facility] Belly allows you to send messages to your doctor, view your test results, renew your prescriptions, schedule appointments, and more. To sign up, go to [employer] and click on the Sign Up Now link in the New User? box. Enter your Belly Activation Code exactly as it appears below along with the last four digits of your Social Security Number and your Date of Birth () to complete the sign-up process. If you do not sign up before the expiration date, you must request a new code.    Belly Activation Code: [email or web address]  Expires: 2017 10:30 AM    If you have questions, you can email [email or web address] or call [phone] to talk to our Belly staff. Remember, Belly is NOT to be used for  urgent needs. For medical emergencies, dial 911.               Other Info from Your Visit           Allergies     No Known Allergies      Reason for Visit     Routine Prenatal Visit no complaints, section tomorrow       Vital Signs     Blood Pressure Weight Last Menstrual Period Body Mass Index Smoking Status       122/60 228 lb (103 kg) (LMP Unknown) 44.53 kg/m2 Former Smoker       Problems and Diagnoses Noted     Normal pregnancy, third trimester    -  Primary

## 2017-01-26 ENCOUNTER — ANESTHESIA (OUTPATIENT)
Dept: PERIOP | Facility: HOSPITAL | Age: 22
End: 2017-01-26

## 2017-01-26 ENCOUNTER — HOSPITAL ENCOUNTER (INPATIENT)
Facility: HOSPITAL | Age: 22
LOS: 3 days | Discharge: HOME | End: 2017-01-29
Attending: OBSTETRICS & GYNECOLOGY | Admitting: OBSTETRICS & GYNECOLOGY

## 2017-01-26 DIAGNOSIS — Z3A.39 39 WEEKS GESTATION OF PREGNANCY: ICD-10-CM

## 2017-01-26 PROCEDURE — 25010000002 MORPHINE PER 10 MG: Performed by: NURSE ANESTHETIST, CERTIFIED REGISTERED

## 2017-01-26 PROCEDURE — 25010000002 MORPHINE PER 10 MG: Performed by: OBSTETRICS & GYNECOLOGY

## 2017-01-26 PROCEDURE — 59514 CESAREAN DELIVERY ONLY: CPT | Performed by: OBSTETRICS & GYNECOLOGY

## 2017-01-26 PROCEDURE — 58611 LIGATE OVIDUCT(S) ADD-ON: CPT | Performed by: OBSTETRICS & GYNECOLOGY

## 2017-01-26 PROCEDURE — 25010000002 ONDANSETRON PER 1 MG: Performed by: NURSE ANESTHETIST, CERTIFIED REGISTERED

## 2017-01-26 PROCEDURE — 0UL70CZ OCCLUSION OF BILATERAL FALLOPIAN TUBES WITH EXTRALUMINAL DEVICE, OPEN APPROACH: ICD-10-PCS | Performed by: OBSTETRICS & GYNECOLOGY

## 2017-01-26 PROCEDURE — 25010000002 FENTANYL CITRATE (PF) 100 MCG/2ML SOLUTION: Performed by: NURSE ANESTHETIST, CERTIFIED REGISTERED

## 2017-01-26 PROCEDURE — 25010000003 CEFAZOLIN PER 500 MG

## 2017-01-26 PROCEDURE — 25010000002 PHENYLEPHRINE PER 1 ML: Performed by: NURSE ANESTHETIST, CERTIFIED REGISTERED

## 2017-01-26 PROCEDURE — 25010000002 PROMETHAZINE PER 50 MG: Performed by: OBSTETRICS & GYNECOLOGY

## 2017-01-26 DEVICE — CLIP FALLOP FILSHIE TI PR STRL BX/20: Type: IMPLANTABLE DEVICE | Site: FALLOPIAN TUBE | Status: FUNCTIONAL

## 2017-01-26 RX ORDER — MORPHINE SULFATE 1 MG/ML
INJECTION, SOLUTION EPIDURAL; INTRATHECAL; INTRAVENOUS AS NEEDED
Status: DISCONTINUED | OUTPATIENT
Start: 2017-01-26 | End: 2017-01-26 | Stop reason: SURG

## 2017-01-26 RX ORDER — PROMETHAZINE HYDROCHLORIDE 25 MG/ML
6.25 INJECTION, SOLUTION INTRAMUSCULAR; INTRAVENOUS ONCE AS NEEDED
Status: DISCONTINUED | OUTPATIENT
Start: 2017-01-26 | End: 2017-01-26 | Stop reason: HOSPADM

## 2017-01-26 RX ORDER — ONDANSETRON 2 MG/ML
4 INJECTION INTRAMUSCULAR; INTRAVENOUS EVERY 6 HOURS PRN
Status: DISCONTINUED | OUTPATIENT
Start: 2017-01-26 | End: 2017-01-29 | Stop reason: HOSPADM

## 2017-01-26 RX ORDER — PROMETHAZINE HYDROCHLORIDE 25 MG/ML
12.5 INJECTION, SOLUTION INTRAMUSCULAR; INTRAVENOUS EVERY 6 HOURS PRN
Status: DISCONTINUED | OUTPATIENT
Start: 2017-01-26 | End: 2017-01-29 | Stop reason: HOSPADM

## 2017-01-26 RX ORDER — MORPHINE SULFATE 4 MG/ML
4 INJECTION, SOLUTION INTRAMUSCULAR; INTRAVENOUS
Status: DISCONTINUED | OUTPATIENT
Start: 2017-01-26 | End: 2017-01-29 | Stop reason: HOSPADM

## 2017-01-26 RX ORDER — LANOLIN 100 %
OINTMENT (GRAM) TOPICAL
Status: DISCONTINUED | OUTPATIENT
Start: 2017-01-26 | End: 2017-01-29 | Stop reason: HOSPADM

## 2017-01-26 RX ORDER — PROMETHAZINE HYDROCHLORIDE 12.5 MG/1
12.5 SUPPOSITORY RECTAL EVERY 6 HOURS PRN
Status: DISCONTINUED | OUTPATIENT
Start: 2017-01-26 | End: 2017-01-29 | Stop reason: HOSPADM

## 2017-01-26 RX ORDER — SODIUM CHLORIDE 0.9 % (FLUSH) 0.9 %
1-10 SYRINGE (ML) INJECTION AS NEEDED
Status: DISCONTINUED | OUTPATIENT
Start: 2017-01-26 | End: 2017-01-26 | Stop reason: HOSPADM

## 2017-01-26 RX ORDER — PROMETHAZINE HYDROCHLORIDE 25 MG/1
25 TABLET ORAL EVERY 6 HOURS PRN
Status: DISCONTINUED | OUTPATIENT
Start: 2017-01-26 | End: 2017-01-29 | Stop reason: HOSPADM

## 2017-01-26 RX ORDER — ZOLPIDEM TARTRATE 5 MG/1
10 TABLET ORAL NIGHTLY PRN
Status: DISCONTINUED | OUTPATIENT
Start: 2017-01-26 | End: 2017-01-29 | Stop reason: HOSPADM

## 2017-01-26 RX ORDER — FENTANYL CITRATE 50 UG/ML
INJECTION, SOLUTION INTRAMUSCULAR; INTRAVENOUS AS NEEDED
Status: DISCONTINUED | OUTPATIENT
Start: 2017-01-26 | End: 2017-01-26 | Stop reason: SURG

## 2017-01-26 RX ORDER — DOCUSATE SODIUM 100 MG/1
100 CAPSULE, LIQUID FILLED ORAL 2 TIMES DAILY PRN
Status: DISCONTINUED | OUTPATIENT
Start: 2017-01-26 | End: 2017-01-29 | Stop reason: HOSPADM

## 2017-01-26 RX ORDER — SODIUM CHLORIDE, SODIUM LACTATE, POTASSIUM CHLORIDE, CALCIUM CHLORIDE 600; 310; 30; 20 MG/100ML; MG/100ML; MG/100ML; MG/100ML
125 INJECTION, SOLUTION INTRAVENOUS CONTINUOUS
Status: DISCONTINUED | OUTPATIENT
Start: 2017-01-26 | End: 2017-01-29 | Stop reason: HOSPADM

## 2017-01-26 RX ORDER — ONDANSETRON 4 MG/1
4 TABLET, FILM COATED ORAL EVERY 8 HOURS PRN
Status: DISCONTINUED | OUTPATIENT
Start: 2017-01-26 | End: 2017-01-29 | Stop reason: HOSPADM

## 2017-01-26 RX ORDER — OXYCODONE HYDROCHLORIDE AND ACETAMINOPHEN 5; 325 MG/1; MG/1
2 TABLET ORAL EVERY 4 HOURS PRN
Status: DISCONTINUED | OUTPATIENT
Start: 2017-01-26 | End: 2017-01-29 | Stop reason: HOSPADM

## 2017-01-26 RX ORDER — ONDANSETRON 2 MG/ML
INJECTION INTRAMUSCULAR; INTRAVENOUS AS NEEDED
Status: DISCONTINUED | OUTPATIENT
Start: 2017-01-26 | End: 2017-01-26 | Stop reason: SURG

## 2017-01-26 RX ORDER — CARBOPROST TROMETHAMINE 250 UG/ML
250 INJECTION, SOLUTION INTRAMUSCULAR AS NEEDED
Status: DISCONTINUED | OUTPATIENT
Start: 2017-01-26 | End: 2017-01-29 | Stop reason: HOSPADM

## 2017-01-26 RX ORDER — ONDANSETRON 4 MG/1
4 TABLET, FILM COATED ORAL EVERY 6 HOURS PRN
Status: DISCONTINUED | OUTPATIENT
Start: 2017-01-26 | End: 2017-01-29 | Stop reason: HOSPADM

## 2017-01-26 RX ORDER — SIMETHICONE 80 MG
80 TABLET,CHEWABLE ORAL 4 TIMES DAILY PRN
Status: DISCONTINUED | OUTPATIENT
Start: 2017-01-26 | End: 2017-01-29 | Stop reason: HOSPADM

## 2017-01-26 RX ORDER — OXYTOCIN 10 [USP'U]/ML
333 INJECTION, SOLUTION INTRAMUSCULAR; INTRAVENOUS CONTINUOUS
Status: DISCONTINUED | OUTPATIENT
Start: 2017-01-26 | End: 2017-01-26 | Stop reason: SDUPTHER

## 2017-01-26 RX ORDER — DIPHENHYDRAMINE HCL 25 MG
25 CAPSULE ORAL EVERY 4 HOURS PRN
Status: DISCONTINUED | OUTPATIENT
Start: 2017-01-26 | End: 2017-01-29 | Stop reason: HOSPADM

## 2017-01-26 RX ORDER — ONDANSETRON 4 MG/1
4 TABLET, ORALLY DISINTEGRATING ORAL EVERY 6 HOURS PRN
Status: DISCONTINUED | OUTPATIENT
Start: 2017-01-26 | End: 2017-01-29 | Stop reason: HOSPADM

## 2017-01-26 RX ORDER — PROMETHAZINE HYDROCHLORIDE 12.5 MG/1
12.5 TABLET ORAL EVERY 6 HOURS PRN
Status: DISCONTINUED | OUTPATIENT
Start: 2017-01-26 | End: 2017-01-29 | Stop reason: HOSPADM

## 2017-01-26 RX ORDER — OXYTOCIN 10 [USP'U]/ML
INJECTION, SOLUTION INTRAMUSCULAR; INTRAVENOUS AS NEEDED
Status: DISCONTINUED | OUTPATIENT
Start: 2017-01-26 | End: 2017-01-26 | Stop reason: SURG

## 2017-01-26 RX ORDER — NALOXONE HCL 0.4 MG/ML
0.4 VIAL (ML) INJECTION
Status: ACTIVE | OUTPATIENT
Start: 2017-01-26 | End: 2017-01-27

## 2017-01-26 RX ORDER — FAMOTIDINE 10 MG/ML
20 INJECTION, SOLUTION INTRAVENOUS ONCE
Status: DISCONTINUED | OUTPATIENT
Start: 2017-01-26 | End: 2017-01-29 | Stop reason: HOSPADM

## 2017-01-26 RX ORDER — MEPERIDINE HYDROCHLORIDE 50 MG/ML
50 INJECTION INTRAMUSCULAR; INTRAVENOUS; SUBCUTANEOUS ONCE AS NEEDED
Status: DISCONTINUED | OUTPATIENT
Start: 2017-01-26 | End: 2017-01-26 | Stop reason: HOSPADM

## 2017-01-26 RX ORDER — NALOXONE HCL 0.4 MG/ML
0.4 VIAL (ML) INJECTION
Status: DISCONTINUED | OUTPATIENT
Start: 2017-01-26 | End: 2017-01-29 | Stop reason: HOSPADM

## 2017-01-26 RX ORDER — METHYLERGONOVINE MALEATE 0.2 MG/ML
200 INJECTION INTRAVENOUS ONCE AS NEEDED
Status: DISCONTINUED | OUTPATIENT
Start: 2017-01-26 | End: 2017-01-29 | Stop reason: HOSPADM

## 2017-01-26 RX ORDER — NALOXONE HCL 0.4 MG/ML
0.4 VIAL (ML) INJECTION
Status: DISCONTINUED | OUTPATIENT
Start: 2017-01-26 | End: 2017-01-26

## 2017-01-26 RX ORDER — MORPHINE SULFATE 4 MG/ML
6 INJECTION, SOLUTION INTRAMUSCULAR; INTRAVENOUS EVERY 4 HOURS PRN
Status: DISCONTINUED | OUTPATIENT
Start: 2017-01-26 | End: 2017-01-29 | Stop reason: HOSPADM

## 2017-01-26 RX ORDER — CALCIUM CARBONATE 200(500)MG
2 TABLET,CHEWABLE ORAL EVERY 6 HOURS PRN
Status: DISCONTINUED | OUTPATIENT
Start: 2017-01-26 | End: 2017-01-29 | Stop reason: HOSPADM

## 2017-01-26 RX ORDER — PROMETHAZINE HYDROCHLORIDE 25 MG/ML
12.5 INJECTION, SOLUTION INTRAMUSCULAR; INTRAVENOUS EVERY 6 HOURS PRN
Status: DISCONTINUED | OUTPATIENT
Start: 2017-01-26 | End: 2017-01-26

## 2017-01-26 RX ORDER — SODIUM CHLORIDE, SODIUM LACTATE, POTASSIUM CHLORIDE, CALCIUM CHLORIDE 600; 310; 30; 20 MG/100ML; MG/100ML; MG/100ML; MG/100ML
INJECTION, SOLUTION INTRAVENOUS CONTINUOUS PRN
Status: DISCONTINUED | OUTPATIENT
Start: 2017-01-26 | End: 2017-01-26 | Stop reason: SURG

## 2017-01-26 RX ORDER — LIDOCAINE HYDROCHLORIDE 10 MG/ML
5 INJECTION, SOLUTION INFILTRATION; PERINEURAL AS NEEDED
Status: DISCONTINUED | OUTPATIENT
Start: 2017-01-26 | End: 2017-01-26 | Stop reason: HOSPADM

## 2017-01-26 RX ORDER — OXYCODONE HYDROCHLORIDE AND ACETAMINOPHEN 5; 325 MG/1; MG/1
1 TABLET ORAL EVERY 4 HOURS PRN
Status: DISCONTINUED | OUTPATIENT
Start: 2017-01-27 | End: 2017-01-29 | Stop reason: HOSPADM

## 2017-01-26 RX ORDER — MEPERIDINE HYDROCHLORIDE 50 MG/ML
12.5 INJECTION INTRAMUSCULAR; INTRAVENOUS; SUBCUTANEOUS ONCE AS NEEDED
Status: DISCONTINUED | OUTPATIENT
Start: 2017-01-26 | End: 2017-01-26 | Stop reason: HOSPADM

## 2017-01-26 RX ORDER — MAGNESIUM HYDROXIDE/ALUMINUM HYDROXICE/SIMETHICONE 120; 1200; 1200 MG/30ML; MG/30ML; MG/30ML
30 SUSPENSION ORAL EVERY 4 HOURS PRN
Status: DISCONTINUED | OUTPATIENT
Start: 2017-01-26 | End: 2017-01-29 | Stop reason: HOSPADM

## 2017-01-26 RX ORDER — ONDANSETRON 2 MG/ML
4 INJECTION INTRAMUSCULAR; INTRAVENOUS ONCE AS NEEDED
Status: DISCONTINUED | OUTPATIENT
Start: 2017-01-26 | End: 2017-01-26 | Stop reason: HOSPADM

## 2017-01-26 RX ORDER — ONDANSETRON 2 MG/ML
4 INJECTION INTRAMUSCULAR; INTRAVENOUS ONCE AS NEEDED
Status: ACTIVE | OUTPATIENT
Start: 2017-01-26 | End: 2017-01-27

## 2017-01-26 RX ORDER — IBUPROFEN 600 MG/1
600 TABLET ORAL EVERY 8 HOURS PRN
Status: DISCONTINUED | OUTPATIENT
Start: 2017-01-27 | End: 2017-01-29 | Stop reason: HOSPADM

## 2017-01-26 RX ORDER — MORPHINE SULFATE 4 MG/ML
6 INJECTION, SOLUTION INTRAMUSCULAR; INTRAVENOUS EVERY 4 HOURS PRN
Status: DISCONTINUED | OUTPATIENT
Start: 2017-01-26 | End: 2017-01-26

## 2017-01-26 RX ORDER — MISOPROSTOL 200 UG/1
800 TABLET ORAL AS NEEDED
Status: DISCONTINUED | OUTPATIENT
Start: 2017-01-26 | End: 2017-01-29 | Stop reason: HOSPADM

## 2017-01-26 RX ORDER — BISACODYL 10 MG
10 SUPPOSITORY, RECTAL RECTAL DAILY PRN
Status: DISCONTINUED | OUTPATIENT
Start: 2017-01-26 | End: 2017-01-29 | Stop reason: HOSPADM

## 2017-01-26 RX ADMIN — PHENYLEPHRINE HYDROCHLORIDE 100 MCG: 10 INJECTION INTRAVENOUS at 08:13

## 2017-01-26 RX ADMIN — ONDANSETRON 4 MG: 2 INJECTION INTRAMUSCULAR; INTRAVENOUS at 08:25

## 2017-01-26 RX ADMIN — SODIUM CHLORIDE, POTASSIUM CHLORIDE, SODIUM LACTATE AND CALCIUM CHLORIDE 1000 ML: 600; 310; 30; 20 INJECTION, SOLUTION INTRAVENOUS at 07:57

## 2017-01-26 RX ADMIN — SODIUM CHLORIDE, POTASSIUM CHLORIDE, SODIUM LACTATE AND CALCIUM CHLORIDE 1000 ML: 600; 310; 30; 20 INJECTION, SOLUTION INTRAVENOUS at 07:09

## 2017-01-26 RX ADMIN — SODIUM CHLORIDE, POTASSIUM CHLORIDE, SODIUM LACTATE AND CALCIUM CHLORIDE: 600; 310; 30; 20 INJECTION, SOLUTION INTRAVENOUS at 08:51

## 2017-01-26 RX ADMIN — MORPHINE SULFATE 0.2 MG: 1 INJECTION EPIDURAL; INTRATHECAL; INTRAVENOUS at 08:07

## 2017-01-26 RX ADMIN — CEFAZOLIN SODIUM 2 G: 2 SOLUTION INTRAVENOUS at 08:10

## 2017-01-26 RX ADMIN — OXYTOCIN 20 UNITS: 10 INJECTION INTRAVENOUS at 08:51

## 2017-01-26 RX ADMIN — SODIUM CHLORIDE, POTASSIUM CHLORIDE, SODIUM LACTATE AND CALCIUM CHLORIDE 125 ML/HR: 600; 310; 30; 20 INJECTION, SOLUTION INTRAVENOUS at 05:30

## 2017-01-26 RX ADMIN — MORPHINE SULFATE 4 MG: 4 INJECTION, SOLUTION INTRAMUSCULAR; INTRAVENOUS at 18:01

## 2017-01-26 RX ADMIN — SODIUM CHLORIDE, POTASSIUM CHLORIDE, SODIUM LACTATE AND CALCIUM CHLORIDE 125 ML/HR: 600; 310; 30; 20 INJECTION, SOLUTION INTRAVENOUS at 20:00

## 2017-01-26 RX ADMIN — PROMETHAZINE HYDROCHLORIDE 12.5 MG: 25 INJECTION INTRAMUSCULAR; INTRAVENOUS at 21:26

## 2017-01-26 RX ADMIN — FENTANYL CITRATE 10 MCG: 50 INJECTION, SOLUTION INTRAMUSCULAR; INTRAVENOUS at 08:07

## 2017-01-26 RX ADMIN — OXYTOCIN 20 UNITS: 10 INJECTION INTRAVENOUS at 08:24

## 2017-01-26 RX ADMIN — SODIUM CHLORIDE, POTASSIUM CHLORIDE, SODIUM LACTATE AND CALCIUM CHLORIDE: 600; 310; 30; 20 INJECTION, SOLUTION INTRAVENOUS at 07:56

## 2017-01-26 RX ADMIN — MORPHINE SULFATE 4 MG: 4 INJECTION, SOLUTION INTRAMUSCULAR; INTRAVENOUS at 11:37

## 2017-01-26 RX ADMIN — MORPHINE SULFATE 6 MG: 4 INJECTION, SOLUTION INTRAMUSCULAR; INTRAVENOUS at 21:25

## 2017-01-26 NOTE — ANESTHESIA PROCEDURE NOTES
Spinal Block    Patient location during procedure: OR  Start Time: 1/26/2017 8:02 AM  Stop Time: 1/26/2017 8:07 AM  Performed By  CRNA: NICOLE LEYVA  Preanesthetic Checklist  Completed: patient identified, site marked, surgical consent, pre-op evaluation, timeout performed, IV checked, risks and benefits discussed and monitors and equipment checked  Spinal Block Prep:  Patient Position:sitting  Sterile Tech:cap, gloves, mask and sterile barriers  Prep:Chloraprep  Patient Monitoring:blood pressure monitoring, continuous pulse oximetry and EKG  Spinal Block Procedure  Approach:midline  Guidance:landmark technique  Location:L3-L4  Needle Type:Conrado  Needle Gauge:25 G  Placement of Spinal needle event:cerebrospinal fluid  Fluid Appearance:clear  Post Assessment  Patient Tolerance:patient tolerated the procedure well with no apparent complications  Complications no  Additional Notes  1.2 ml 0.75% marcaine with dextrose intrathecal, Pt tolerated well, T4 level, Allis clamp negative

## 2017-01-26 NOTE — ANESTHESIA PREPROCEDURE EVALUATION
Anesthesia Evaluation     Patient summary reviewed and Nursing notes reviewed    No history of anesthetic complications   Airway   Mallampati: II  TM distance: >3 FB  Neck ROM: full  possible difficult intubation  Comment: Pregnant, obese    Dental - normal exam     Pulmonary - normal exam   (+) hx of smoking (quit smoking at beginning of preganancy), asthma (no inhaler for 2-3 years),   (-) pneumonia, shortness of breath, recent URI, sleep apnea (snores), pulmonary embolism  Cardiovascular - normal exam  (-) pacemaker, hypertension, valvular problems/murmurs, past MI, CAD, dysrhythmias, angina, CHF, orthopnea, PND, PAGE, cardiac stents, CABG, PVD    Neuro/Psych- negative ROS  GI/Hepatic/Renal/Endo    (+) morbid obesity, PUD,     Musculoskeletal (-) negative ROS    Abdominal    Substance History - negative use     OB/GYN    (+) Pregnant,   (-) Preeclampsia (with first pregancy) and history of pregnancy induced hypertension    Comment: No issues with blood pressure with this pregnancy        Other - negative ROS                      Anesthesia Plan    ASA 3     spinal   (Risk and benefits discussed, discussed risk of nausea, vomiting, itching, low blood pressure, post-procedural back pain, PDPH, and infection. Patient reports understanding. Continue with POC)  Anesthetic plan and risks discussed with patient.  Use of blood products discussed with patient  Consented to blood products.

## 2017-01-26 NOTE — ANESTHESIA POSTPROCEDURE EVALUATION
Patient: Bessie Westfall    Procedure Summary     Date Anesthesia Start Anesthesia Stop Room / Location    17 [phone]  [facility] 2 /  [facility]       Procedure Diagnosis Surgeon Provider     SECTION REPEAT WITH TUBAL (N/A Abdomen) Intrauterine normal pregnancy, third trimester; Encounter for sterilization MD Tabatha Herrera CRNA          Anesthesia Type: spinal  Last vitals  /70 (17 1024)    Temp 97.6 °F (36.4 °C) (17 1024)    Pulse 88 (17 1024)   Resp 16 (17 1024)    SpO2 100 % (17 1024)      Post Anesthesia Care and Evaluation    Patient location during evaluation: PACU  Patient participation: complete - patient participated  Level of consciousness: awake and alert  Pain management: satisfactory to patient  Airway patency: patent  Anesthetic complications: No anesthetic complications  PONV Status: none  Cardiovascular status: acceptable and stable  Respiratory status: acceptable  Hydration status: acceptable  Post Neuraxial Block status: Motor and sensory function returned to baseline and No signs or symptoms of PDPH

## 2017-01-27 LAB
BASOPHILS # BLD AUTO: 0.04 10*3/MM3 (ref 0–0.2)
BASOPHILS NFR BLD AUTO: 0.4 % (ref 0–2.5)
DEPRECATED RDW RBC AUTO: 48.4 FL (ref 37–54)
EOSINOPHIL # BLD AUTO: 0.1 10*3/MM3 (ref 0–0.7)
EOSINOPHIL NFR BLD AUTO: 1.1 % (ref 0–7)
ERYTHROCYTE [DISTWIDTH] IN BLOOD BY AUTOMATED COUNT: 14.7 % (ref 11.5–14.5)
HCT VFR BLD AUTO: 31.6 % (ref 37–47)
HGB BLD-MCNC: 10.2 G/DL (ref 12–16)
IMM GRANULOCYTES # BLD: 0.05 10*3/MM3 (ref 0–0.06)
IMM GRANULOCYTES NFR BLD: 0.6 % (ref 0–0.6)
LYMPHOCYTES # BLD AUTO: 1.68 10*3/MM3 (ref 0.6–3.4)
LYMPHOCYTES NFR BLD AUTO: 18.6 % (ref 10–50)
MCH RBC QN AUTO: 29.4 PG (ref 27–31)
MCHC RBC AUTO-ENTMCNC: 32.3 G/DL (ref 30–37)
MCV RBC AUTO: 91.1 FL (ref 81–99)
MONOCYTES # BLD AUTO: 0.79 10*3/MM3 (ref 0–0.9)
MONOCYTES NFR BLD AUTO: 8.8 % (ref 0–12)
NEUTROPHILS # BLD AUTO: 6.36 10*3/MM3 (ref 2–6.9)
NEUTROPHILS NFR BLD AUTO: 70.5 % (ref 37–80)
NRBC BLD MANUAL-RTO: 0 /100 WBC (ref 0–0)
PLATELET # BLD AUTO: 196 10*3/MM3 (ref 130–400)
PMV BLD AUTO: 9.7 FL (ref 6–12)
RBC # BLD AUTO: 3.47 10*6/MM3 (ref 4.2–5.4)
WBC NRBC COR # BLD: 9.02 10*3/MM3 (ref 4.8–10.8)

## 2017-01-27 PROCEDURE — 90715 TDAP VACCINE 7 YRS/> IM: CPT | Performed by: OBSTETRICS & GYNECOLOGY

## 2017-01-27 PROCEDURE — 99231 SBSQ HOSP IP/OBS SF/LOW 25: CPT | Performed by: PHYSICIAN ASSISTANT

## 2017-01-27 PROCEDURE — 25010000002 MORPHINE PER 10 MG: Performed by: OBSTETRICS & GYNECOLOGY

## 2017-01-27 PROCEDURE — 85025 COMPLETE CBC W/AUTO DIFF WBC: CPT | Performed by: OBSTETRICS & GYNECOLOGY

## 2017-01-27 PROCEDURE — 90471 IMMUNIZATION ADMIN: CPT | Performed by: OBSTETRICS & GYNECOLOGY

## 2017-01-27 PROCEDURE — 25010000002 TDAP 5-2.5-18.5 LF-MCG/0.5 SUSPENSION: Performed by: OBSTETRICS & GYNECOLOGY

## 2017-01-27 RX ORDER — FERROUS SULFATE TAB EC 324 MG (65 MG FE EQUIVALENT) 324 (65 FE) MG
325 TABLET DELAYED RESPONSE ORAL 2 TIMES DAILY WITH MEALS
Status: DISCONTINUED | OUTPATIENT
Start: 2017-01-27 | End: 2017-01-29 | Stop reason: HOSPADM

## 2017-01-27 RX ADMIN — SIMETHICONE CHEW TAB 80 MG 80 MG: 80 TABLET ORAL at 08:16

## 2017-01-27 RX ADMIN — IBUPROFEN 600 MG: 600 TABLET ORAL at 08:21

## 2017-01-27 RX ADMIN — SIMETHICONE CHEW TAB 80 MG 80 MG: 80 TABLET ORAL at 14:50

## 2017-01-27 RX ADMIN — SODIUM CHLORIDE, POTASSIUM CHLORIDE, SODIUM LACTATE AND CALCIUM CHLORIDE 125 ML/HR: 600; 310; 30; 20 INJECTION, SOLUTION INTRAVENOUS at 03:23

## 2017-01-27 RX ADMIN — Medication: at 22:00

## 2017-01-27 RX ADMIN — TETANUS TOXOID, REDUCED DIPHTHERIA TOXOID AND ACELLULAR PERTUSSIS VACCINE, ADSORBED 0.5 ML: 5; 2.5; 8; 8; 2.5 SUSPENSION INTRAMUSCULAR at 22:00

## 2017-01-27 RX ADMIN — OXYCODONE HYDROCHLORIDE AND ACETAMINOPHEN 2 TABLET: 5; 325 TABLET ORAL at 17:41

## 2017-01-27 RX ADMIN — FERROUS SULFATE TAB EC 324 MG (65 MG FE EQUIVALENT) 324 MG: 324 (65 FE) TABLET DELAYED RESPONSE at 17:41

## 2017-01-27 RX ADMIN — MORPHINE SULFATE 6 MG: 4 INJECTION, SOLUTION INTRAMUSCULAR; INTRAVENOUS at 01:21

## 2017-01-27 RX ADMIN — FERROUS SULFATE TAB EC 324 MG (65 MG FE EQUIVALENT) 324 MG: 324 (65 FE) TABLET DELAYED RESPONSE at 08:20

## 2017-01-27 RX ADMIN — OXYCODONE HYDROCHLORIDE AND ACETAMINOPHEN 2 TABLET: 5; 325 TABLET ORAL at 10:31

## 2017-01-27 RX ADMIN — OXYCODONE HYDROCHLORIDE AND ACETAMINOPHEN 2 TABLET: 5; 325 TABLET ORAL at 05:07

## 2017-01-27 RX ADMIN — IBUPROFEN 600 MG: 600 TABLET ORAL at 17:42

## 2017-01-28 RX ADMIN — FERROUS SULFATE TAB EC 324 MG (65 MG FE EQUIVALENT) 324 MG: 324 (65 FE) TABLET DELAYED RESPONSE at 08:54

## 2017-01-28 RX ADMIN — IBUPROFEN 600 MG: 600 TABLET ORAL at 10:37

## 2017-01-28 RX ADMIN — OXYCODONE HYDROCHLORIDE AND ACETAMINOPHEN 2 TABLET: 5; 325 TABLET ORAL at 05:42

## 2017-01-28 RX ADMIN — OXYCODONE HYDROCHLORIDE AND ACETAMINOPHEN 1 TABLET: 5; 325 TABLET ORAL at 21:53

## 2017-01-28 RX ADMIN — OXYCODONE HYDROCHLORIDE AND ACETAMINOPHEN 1 TABLET: 5; 325 TABLET ORAL at 02:36

## 2017-01-28 RX ADMIN — OXYCODONE HYDROCHLORIDE AND ACETAMINOPHEN 2 TABLET: 5; 325 TABLET ORAL at 10:37

## 2017-01-29 VITALS
RESPIRATION RATE: 18 BRPM | OXYGEN SATURATION: 97 % | SYSTOLIC BLOOD PRESSURE: 121 MMHG | HEART RATE: 88 BPM | DIASTOLIC BLOOD PRESSURE: 81 MMHG | HEIGHT: 60 IN | BODY MASS INDEX: 44.76 KG/M2 | TEMPERATURE: 98.5 F | WEIGHT: 228 LBS

## 2017-01-29 RX ORDER — IBUPROFEN 600 MG/1
600 TABLET ORAL EVERY 8 HOURS PRN
Qty: 60 TABLET | Refills: 0 | Status: SHIPPED | OUTPATIENT
Start: 2017-01-29 | End: 2019-10-06

## 2017-01-29 RX ORDER — FERROUS SULFATE TAB EC 324 MG (65 MG FE EQUIVALENT) 324 (65 FE) MG
325 TABLET DELAYED RESPONSE ORAL 2 TIMES DAILY WITH MEALS
Qty: 30 TABLET | Refills: 3 | Status: SHIPPED | OUTPATIENT
Start: 2017-01-29 | End: 2017-03-08

## 2017-01-29 RX ORDER — OXYCODONE HYDROCHLORIDE AND ACETAMINOPHEN 5; 325 MG/1; MG/1
1 TABLET ORAL EVERY 4 HOURS PRN
Qty: 30 TABLET | Refills: 0 | Status: SHIPPED | OUTPATIENT
Start: 2017-01-29 | End: 2017-02-05

## 2017-01-29 RX ADMIN — FERROUS SULFATE TAB EC 324 MG (65 MG FE EQUIVALENT) 324 MG: 324 (65 FE) TABLET DELAYED RESPONSE at 07:46

## 2017-01-29 RX ADMIN — OXYCODONE HYDROCHLORIDE AND ACETAMINOPHEN 1 TABLET: 5; 325 TABLET ORAL at 03:19

## 2017-01-30 ENCOUNTER — RESULTS ENCOUNTER (OUTPATIENT)
Dept: OBSTETRICS AND GYNECOLOGY | Facility: CLINIC | Age: 22
End: 2017-01-30

## 2017-01-30 DIAGNOSIS — Z3A.39 39 WEEKS GESTATION OF PREGNANCY: ICD-10-CM

## 2017-02-02 ENCOUNTER — POSTPARTUM VISIT (OUTPATIENT)
Dept: OBSTETRICS AND GYNECOLOGY | Facility: CLINIC | Age: 22
End: 2017-02-02

## 2017-02-02 VITALS
HEIGHT: 60 IN | SYSTOLIC BLOOD PRESSURE: 128 MMHG | DIASTOLIC BLOOD PRESSURE: 72 MMHG | WEIGHT: 207 LBS | BODY MASS INDEX: 40.64 KG/M2

## 2017-02-02 DIAGNOSIS — Z98.891 S/P C-SECTION: Primary | ICD-10-CM

## 2017-02-02 PROCEDURE — 99212 OFFICE O/P EST SF 10 MIN: CPT | Performed by: NURSE PRACTITIONER

## 2017-02-02 NOTE — PROGRESS NOTES
"    Postpartum Progress Note    Patient name: Bessie Westfall  Date of Service: 2017    ID: 21 y.o.     Diagnosis:   1 week post op  section  Patient Active Problem List   Diagnosis   • Pregnancy       Subjective:      No complaints, Lochia light .  Ambulating, B&B habits wnl, tolerating regular diet  Pain well controlled.  The patient is currently breastfeeding.       Objective:      Vital signs:  Visit Vitals   • /72   • Ht 60\" (152.4 cm)   • Wt 207 lb (93.9 kg)   • LMP  (LMP Unknown)   • Breastfeeding Yes   • BMI 40.43 kg/m2      General: Alert & oriented x4, in no apparent distress  Abdomen: soft, nontender  Incision: healed well  Extremities: nontender; no edema      Labs:  Lab Results   Component Value Date    WBC 9.02 2017    HGB 10.2 (L) 2017    HCT 31.6 (L) 2017    MCV 91.1 2017     2017         External Prenatal Results         Pregnancy Outside Results - these were transcribed from office records.  See scanned records for details. Date Time   Hgb ^ 11.6 g/dL 10/31/16    Hct ^ 34 % 10/31/16    ABO ^ O  16    Rh ^ Positive  16    Antibody Screen ^ Normal  (A) 16    Glucose Fasting GTT      Glucose Tolerance Test 1 hour ^ 125  10/31/16    Glucose Tolerance Test 3 hour      Gonorrhea (discrete)      Chlamydia (discrete)      RPR ^ Non-Reactive  16    VDRL      Syphillis Antibody      Rubella ^ Immune  16    HBsAg ^ Negative  16    Herpes Simplex Virus PCR      Herpes Simplex VIrus Culture      HIV ^ Negative  16    Hep C RNA Quant PCR      Hep C Antibody      Urine Drug Screen      AFP      Group B Strep      GBS Susceptibility to Clindamycin      GBS Susceptibility to Eythromycin      Fetal Fibronectin      Genetic Testing, Maternal Blood             Legend: ^: Historical          Assessment/Plan:        1. S/p  delivery: Doing well.  2. Infant feeding: Supportive care.  The patient is " currently breastfeeding.  3. Contraception: Had BTL   4. Call prn   RTO 4-5 wks       Radha Lobato CNM  2/2/2017

## 2017-03-08 ENCOUNTER — TELEPHONE (OUTPATIENT)
Dept: OBSTETRICS AND GYNECOLOGY | Facility: CLINIC | Age: 22
End: 2017-03-08

## 2017-03-08 ENCOUNTER — POSTPARTUM VISIT (OUTPATIENT)
Dept: OBSTETRICS AND GYNECOLOGY | Facility: CLINIC | Age: 22
End: 2017-03-08

## 2017-03-08 VITALS
SYSTOLIC BLOOD PRESSURE: 134 MMHG | HEIGHT: 60 IN | WEIGHT: 202 LBS | DIASTOLIC BLOOD PRESSURE: 68 MMHG | BODY MASS INDEX: 39.66 KG/M2

## 2017-03-08 PROCEDURE — 99213 OFFICE O/P EST LOW 20 MIN: CPT | Performed by: NURSE PRACTITIONER

## 2017-03-08 NOTE — TELEPHONE ENCOUNTER
----- Message from Radha Lobato CNM sent at 3/8/2017  2:12 PM EST -----  Regarding: med  Please call in Nipple cream to [facility] pharmacy - thanks RH

## 2017-03-08 NOTE — PROGRESS NOTES
Bessie Westfall is a 21 y.o.     She presents for the 6 wks PP visit    The following portions of the patient's history were reviewed and updated as appropriate:vital signs, allergies, current medications, past medical history, past social history, past surgical history and problem list.    Review of Systems -   General: Doing well - some inc discomfort  -  Denies PP blues/depression   Breastfeeding without problems though nipples cracked / baby has thrush   GI: bowel habits normal  Genitourinary: voiding without problems - no menses to date    Objective     Physical Exam  Constitutional   The patient is awake, well developed & well nourished.     Neck   The neck is supple and the trachea is midline.    Breast  Declined exam     Respiratory  The patient is relaxed and breathes without effort.     Cardiovascular    Gastrointestinal   The abdomen is soft and non tender.  No hepatosplenomegaly.    Genitourinary   - External Genitalia without erythema, lesions, or masses  -Urethral Meatus is without erythema, edema, prolapse or lesions.   -Bladder - Palpation at the region above the symphysis pubis             reveals no bladder tenderness.   -Vagina - There is no excessive vaginal discharge.&    vaginal walls reveals moist vaginal mucosa without inflammation or lesions    There is no evidence of pelvic relaxation; there is no cystocele or rectocele.  -Cervix  is smooth, pink, and without discharge. Negative cervical motion tenderness   Uterus - uterine body is of normal size, shape, & without tenderness  Adnexa structures are nontender and without masses  Perineum is without inflammation or lesions    Musculoskeletal  Negative    Extremities  Full ROM     Psychiatric  The patient is oriented to person, place, and time. Speech is fluent and words are clear.     Assessment/Plan     ASSESSMENT   Normal 6 weeks PP visit    PLAN  Pap - GC/CT  SBE monthly - kegels daily  Options regarding birth control   Call prn        This note was electronically signed.    Radha Lobato CNM  March 8, 2017

## 2017-08-12 ENCOUNTER — HOSPITAL ENCOUNTER (EMERGENCY)
Facility: HOSPITAL | Age: 22
Discharge: HOME | End: 2017-08-12
Attending: EMERGENCY MEDICINE | Admitting: EMERGENCY MEDICINE

## 2017-08-12 VITALS
TEMPERATURE: 98.4 F | DIASTOLIC BLOOD PRESSURE: 87 MMHG | BODY MASS INDEX: 38.87 KG/M2 | OXYGEN SATURATION: 99 % | RESPIRATION RATE: 20 BRPM | HEIGHT: 60 IN | WEIGHT: 198 LBS | HEART RATE: 97 BPM | SYSTOLIC BLOOD PRESSURE: 133 MMHG

## 2017-08-12 DIAGNOSIS — Z32.02 PREGNANCY TEST NEGATIVE: Primary | ICD-10-CM

## 2017-08-12 LAB
B-HCG UR QL: NEGATIVE
BILIRUB UR QL STRIP: NEGATIVE
CLARITY UR: CLEAR
COLOR UR: YELLOW
GLUCOSE UR STRIP-MCNC: NEGATIVE MG/DL
HGB UR QL STRIP.AUTO: NEGATIVE
KETONES UR QL STRIP: ABNORMAL
LEUKOCYTE ESTERASE UR QL STRIP.AUTO: NEGATIVE
NITRITE UR QL STRIP: NEGATIVE
PH UR STRIP.AUTO: 6.5 [PH] (ref 5–8)
PROT UR QL STRIP: NEGATIVE
SP GR UR STRIP: 1.02 (ref 1–1.03)
UROBILINOGEN UR QL STRIP: ABNORMAL

## 2017-08-12 PROCEDURE — 81003 URINALYSIS AUTO W/O SCOPE: CPT | Performed by: EMERGENCY MEDICINE

## 2017-08-12 PROCEDURE — 81025 URINE PREGNANCY TEST: CPT | Performed by: EMERGENCY MEDICINE

## 2017-08-12 PROCEDURE — 99283 EMERGENCY DEPT VISIT LOW MDM: CPT

## 2017-08-13 NOTE — DISCHARGE INSTRUCTIONS
See your primary care physician on Monday for a recheck.  Return to the ER for any abdominal pain, vaginal bleeding, difficulty urinating, fever or any other changes that concern you.

## 2017-08-13 NOTE — ED PROVIDER NOTES
Subjective   HPI Comments: 22-year-old female in the ER requesting a pregnancy test.  The patient states she is 1 week and a day late for her period.  She had a tubal ligation in January.  She is  3 para 3.  No other concerns or complaints tonight.      History provided by:  Patient  History limited by: no limits.   used: No        Review of Systems   Constitutional: Negative for activity change, appetite change, fatigue and fever.   HENT: Negative for congestion, ear pain, rhinorrhea, sneezing and sore throat.    Eyes: Negative for pain, discharge and redness.   Respiratory: Negative for cough, shortness of breath and wheezing.    Cardiovascular: Negative.    Gastrointestinal: Negative for abdominal pain, constipation, diarrhea, nausea and vomiting.   Endocrine: Negative.    Genitourinary: Negative for difficulty urinating, dysuria and frequency.   Musculoskeletal: Negative for back pain and neck pain.   Skin: Negative for color change, pallor and rash.   Allergic/Immunologic: Negative.    Neurological: Negative.    Hematological: Negative.    Psychiatric/Behavioral: Negative.    All other systems reviewed and are negative.      Past Medical History:   Diagnosis Date   • Asthma    • Chlamydia    • Gestational hypertension    • Gonorrhea    • Peptic ulceration        Allergies   Allergen Reactions   • Coconut Oil Anaphylaxis       Past Surgical History:   Procedure Laterality Date   •  SECTION  2012   •  SECTION  2014   •  SECTION WITH TUBAL N/A 2017    Procedure:  SECTION REPEAT WITH TUBAL;  Surgeon: Cinthya Garcia MD;  Location: [facility];  Service:    • NO PAST SURGERIES         Family History   Problem Relation Age of Onset   • Hypertension Maternal Grandmother        Social History     Social History   • Marital status: Single     Spouse name: N/A   • Number of children: N/A   • Years of education: N/A     Social History Main Topics   •  Smoking status: Current Every Day Smoker     Packs/day: 0.50   • Smokeless tobacco: Never Used   • Alcohol use No   • Drug use: No   • Sexual activity: Yes     Partners: Male     Birth control/ protection: None     Other Topics Concern   • None     Social History Narrative   • None           Objective   Physical Exam   Constitutional: She is oriented to person, place, and time. She appears well-developed and well-nourished. No distress.   HENT:   Head: Normocephalic and atraumatic.   Right Ear: External ear normal.   Left Ear: External ear normal.   Nose: Nose normal.   Mouth/Throat: Oropharynx is clear and moist. No oropharyngeal exudate.   Eyes: Conjunctivae and EOM are normal. Pupils are equal, round, and reactive to light. Right eye exhibits no discharge. Left eye exhibits no discharge. No scleral icterus.   Neck: Normal range of motion. Neck supple. No JVD present. No tracheal deviation present.   Cardiovascular: Normal rate, regular rhythm and normal heart sounds.    Pulmonary/Chest: Effort normal and breath sounds normal. No stridor. No respiratory distress. She has no wheezes. She has no rales.   Abdominal: Soft. Bowel sounds are normal. She exhibits no distension and no mass. There is no tenderness. There is no rebound and no guarding. No hernia.   Musculoskeletal: Normal range of motion. She exhibits no edema, tenderness or deformity.   Neurological: She is alert and oriented to person, place, and time. No cranial nerve deficit. Coordination normal.   Skin: Skin is warm and dry. No rash noted. She is not diaphoretic. No erythema.   Psychiatric: She has a normal mood and affect. Her behavior is normal. Judgment and thought content normal.   Nursing note and vitals reviewed.      Procedures         ED Course  ED Course                  MDM    Final diagnoses:   Pregnancy test negative            Marilee Means PA-C  08/13/17 5088

## 2017-11-13 ENCOUNTER — LAB (OUTPATIENT)
Dept: LAB | Facility: HOSPITAL | Age: 22
End: 2017-11-13
Attending: MIDWIFE

## 2017-11-13 DIAGNOSIS — N91.2 AMENORRHEA: Primary | ICD-10-CM

## 2017-11-13 DIAGNOSIS — N91.2 AMENORRHEA: ICD-10-CM

## 2017-11-13 LAB — HCG INTACT+B SERPL-ACNC: <2.39 MIU/ML

## 2017-11-13 PROCEDURE — 84702 CHORIONIC GONADOTROPIN TEST: CPT | Performed by: MIDWIFE

## 2017-11-13 PROCEDURE — 36415 COLL VENOUS BLD VENIPUNCTURE: CPT

## 2018-09-16 ENCOUNTER — HOSPITAL ENCOUNTER (EMERGENCY)
Facility: HOSPITAL | Age: 23
Discharge: HOME | End: 2018-09-16
Attending: EMERGENCY MEDICINE | Admitting: EMERGENCY MEDICINE

## 2018-09-16 ENCOUNTER — APPOINTMENT (OUTPATIENT)
Dept: CT IMAGING | Facility: HOSPITAL | Age: 23
End: 2018-09-16

## 2018-09-16 VITALS
WEIGHT: 182 LBS | HEIGHT: 60 IN | TEMPERATURE: 98 F | BODY MASS INDEX: 35.73 KG/M2 | HEART RATE: 69 BPM | SYSTOLIC BLOOD PRESSURE: 104 MMHG | RESPIRATION RATE: 16 BRPM | DIASTOLIC BLOOD PRESSURE: 66 MMHG | OXYGEN SATURATION: 98 %

## 2018-09-16 DIAGNOSIS — R19.7 NAUSEA VOMITING AND DIARRHEA: Primary | ICD-10-CM

## 2018-09-16 DIAGNOSIS — R11.2 NAUSEA VOMITING AND DIARRHEA: Primary | ICD-10-CM

## 2018-09-16 DIAGNOSIS — R10.9 ABDOMINAL PAIN, UNSPECIFIED ABDOMINAL LOCATION: ICD-10-CM

## 2018-09-16 LAB
ALBUMIN SERPL-MCNC: 4.4 G/DL (ref 3.5–5)
ALBUMIN/GLOB SERPL: 1.3 G/DL (ref 1–2)
ALP SERPL-CCNC: 86 U/L (ref 38–126)
ALT SERPL W P-5'-P-CCNC: 31 U/L (ref 13–69)
ANION GAP SERPL CALCULATED.3IONS-SCNC: 11.2 MMOL/L (ref 10–20)
AST SERPL-CCNC: 23 U/L (ref 15–46)
B-HCG UR QL: NEGATIVE
BASOPHILS # BLD AUTO: 0.04 10*3/MM3 (ref 0–0.2)
BASOPHILS NFR BLD AUTO: 0.4 % (ref 0–2.5)
BILIRUB SERPL-MCNC: 0.6 MG/DL (ref 0.2–1.3)
BILIRUB UR QL STRIP: NEGATIVE
BUN BLD-MCNC: 12 MG/DL (ref 7–20)
BUN/CREAT SERPL: 20 (ref 7.1–23.5)
CALCIUM SPEC-SCNC: 9.3 MG/DL (ref 8.4–10.2)
CHLORIDE SERPL-SCNC: 109 MMOL/L (ref 98–107)
CLARITY UR: CLEAR
CO2 SERPL-SCNC: 23 MMOL/L (ref 26–30)
COLOR UR: YELLOW
CREAT BLD-MCNC: 0.6 MG/DL (ref 0.6–1.3)
DEPRECATED RDW RBC AUTO: 42.9 FL (ref 37–54)
EOSINOPHIL # BLD AUTO: 0.22 10*3/MM3 (ref 0–0.7)
EOSINOPHIL NFR BLD AUTO: 2.3 % (ref 0–7)
ERYTHROCYTE [DISTWIDTH] IN BLOOD BY AUTOMATED COUNT: 13.2 % (ref 11.5–14.5)
GFR SERPL CREATININE-BSD FRML MDRD: 149 ML/MIN/1.73
GLOBULIN UR ELPH-MCNC: 3.4 GM/DL
GLUCOSE BLD-MCNC: 96 MG/DL (ref 74–98)
GLUCOSE UR STRIP-MCNC: NEGATIVE MG/DL
HCT VFR BLD AUTO: 36.9 % (ref 37–47)
HGB BLD-MCNC: 11.8 G/DL (ref 12–16)
HGB UR QL STRIP.AUTO: NEGATIVE
IMM GRANULOCYTES # BLD: 0.02 10*3/MM3 (ref 0–0.06)
IMM GRANULOCYTES NFR BLD: 0.2 % (ref 0–0.6)
KETONES UR QL STRIP: NEGATIVE
LEUKOCYTE ESTERASE UR QL STRIP.AUTO: NEGATIVE
LIPASE SERPL-CCNC: 72 U/L (ref 23–300)
LYMPHOCYTES # BLD AUTO: 2.55 10*3/MM3 (ref 0.6–3.4)
LYMPHOCYTES NFR BLD AUTO: 26.2 % (ref 10–50)
MCH RBC QN AUTO: 28.4 PG (ref 27–31)
MCHC RBC AUTO-ENTMCNC: 32 G/DL (ref 30–37)
MCV RBC AUTO: 88.7 FL (ref 81–99)
MONOCYTES # BLD AUTO: 0.67 10*3/MM3 (ref 0–0.9)
MONOCYTES NFR BLD AUTO: 6.9 % (ref 0–12)
NEUTROPHILS # BLD AUTO: 6.22 10*3/MM3 (ref 2–6.9)
NEUTROPHILS NFR BLD AUTO: 64 % (ref 37–80)
NITRITE UR QL STRIP: NEGATIVE
NRBC BLD MANUAL-RTO: 0 /100 WBC (ref 0–0)
PH UR STRIP.AUTO: 5.5 [PH] (ref 5–8)
PLATELET # BLD AUTO: 296 10*3/MM3 (ref 130–400)
PMV BLD AUTO: 9.8 FL (ref 6–12)
POTASSIUM BLD-SCNC: 4.2 MMOL/L (ref 3.5–5.1)
PROT SERPL-MCNC: 7.8 G/DL (ref 6.3–8.2)
PROT UR QL STRIP: NEGATIVE
RBC # BLD AUTO: 4.16 10*6/MM3 (ref 4.2–5.4)
SODIUM BLD-SCNC: 139 MMOL/L (ref 137–145)
SP GR UR STRIP: >=1.03 (ref 1–1.03)
UROBILINOGEN UR QL STRIP: NORMAL
WBC NRBC COR # BLD: 9.72 10*3/MM3 (ref 4.8–10.8)

## 2018-09-16 PROCEDURE — 99283 EMERGENCY DEPT VISIT LOW MDM: CPT

## 2018-09-16 PROCEDURE — 25010000002 ONDANSETRON PER 1 MG: Performed by: EMERGENCY MEDICINE

## 2018-09-16 PROCEDURE — 96374 THER/PROPH/DIAG INJ IV PUSH: CPT

## 2018-09-16 PROCEDURE — 80053 COMPREHEN METABOLIC PANEL: CPT | Performed by: EMERGENCY MEDICINE

## 2018-09-16 PROCEDURE — 74177 CT ABD & PELVIS W/CONTRAST: CPT

## 2018-09-16 PROCEDURE — 81025 URINE PREGNANCY TEST: CPT | Performed by: EMERGENCY MEDICINE

## 2018-09-16 PROCEDURE — 25010000002 KETOROLAC TROMETHAMINE PER 15 MG: Performed by: EMERGENCY MEDICINE

## 2018-09-16 PROCEDURE — 81003 URINALYSIS AUTO W/O SCOPE: CPT | Performed by: EMERGENCY MEDICINE

## 2018-09-16 PROCEDURE — 83690 ASSAY OF LIPASE: CPT | Performed by: EMERGENCY MEDICINE

## 2018-09-16 PROCEDURE — 85025 COMPLETE CBC W/AUTO DIFF WBC: CPT | Performed by: EMERGENCY MEDICINE

## 2018-09-16 PROCEDURE — 96375 TX/PRO/DX INJ NEW DRUG ADDON: CPT

## 2018-09-16 PROCEDURE — 96361 HYDRATE IV INFUSION ADD-ON: CPT

## 2018-09-16 PROCEDURE — 25010000002 IOPAMIDOL 61 % SOLUTION: Performed by: EMERGENCY MEDICINE

## 2018-09-16 RX ORDER — ONDANSETRON 2 MG/ML
4 INJECTION INTRAMUSCULAR; INTRAVENOUS ONCE
Status: COMPLETED | OUTPATIENT
Start: 2018-09-16 | End: 2018-09-16

## 2018-09-16 RX ORDER — ATROPINE SULFATE 0.4 MG/ML
0.4 AMPUL (ML) INJECTION ONCE
Status: COMPLETED | OUTPATIENT
Start: 2018-09-16 | End: 2018-09-16

## 2018-09-16 RX ORDER — KETOROLAC TROMETHAMINE 30 MG/ML
10 INJECTION, SOLUTION INTRAMUSCULAR; INTRAVENOUS ONCE
Status: COMPLETED | OUTPATIENT
Start: 2018-09-16 | End: 2018-09-16

## 2018-09-16 RX ORDER — RANITIDINE 150 MG/1
150 CAPSULE ORAL 2 TIMES DAILY
Qty: 30 CAPSULE | Refills: 0 | Status: SHIPPED | OUTPATIENT
Start: 2018-09-16 | End: 2019-09-16

## 2018-09-16 RX ORDER — ONDANSETRON 4 MG/1
4 TABLET, ORALLY DISINTEGRATING ORAL EVERY 6 HOURS PRN
Qty: 12 TABLET | Refills: 0 | Status: SHIPPED | OUTPATIENT
Start: 2018-09-16 | End: 2019-10-06

## 2018-09-16 RX ORDER — DIPHENOXYLATE HYDROCHLORIDE AND ATROPINE SULFATE 2.5; .025 MG/1; MG/1
1 TABLET ORAL 4 TIMES DAILY PRN
Qty: 12 TABLET | Refills: 0 | Status: SHIPPED | OUTPATIENT
Start: 2018-09-16 | End: 2019-10-06

## 2018-09-16 RX ADMIN — ATROPINE SULFATE 0.4 MG: 0.4 INJECTION, SOLUTION INTRAMUSCULAR; INTRAVENOUS; SUBCUTANEOUS at 09:12

## 2018-09-16 RX ADMIN — KETOROLAC TROMETHAMINE 10 MG: 30 INJECTION, SOLUTION INTRAMUSCULAR at 09:12

## 2018-09-16 RX ADMIN — ONDANSETRON 4 MG: 2 INJECTION INTRAMUSCULAR; INTRAVENOUS at 09:12

## 2018-09-16 RX ADMIN — IOPAMIDOL 100 ML: 612 INJECTION, SOLUTION INTRAVENOUS at 10:17

## 2018-09-16 RX ADMIN — SODIUM CHLORIDE 1000 ML: 9 INJECTION, SOLUTION INTRAVENOUS at 09:12

## 2018-09-16 NOTE — ED PROVIDER NOTES
TRIAGE CHIEF COMPLAINT:     Nursing and triage notes reviewed    Chief Complaint   Patient presents with   • Abdominal Pain      HPI: Bessie Padilla is a 23 y.o. female who presents to the emergency department complaining of abdominal pain, nausea, vomiting, diarrhea.  Symptoms began yesterday.  According to  the patient gets these symptoms once or twice a month.  Patient describes a burning pain across her upper abdomen radiating through to her back.  She's had several episodes of nonbloody diarrhea.  Diffuse abdominal cramps as well.  Has had some chills but denies having a fever.  No sick contacts.  No recent travel.  No dysurina or hematuria.  No chest pain or shortness of breath.     REVIEW OF SYSTEMS: All other systems reviewed and are negative     PAST MEDICAL HISTORY:   Past Medical History:   Diagnosis Date   • Asthma    • Chlamydia    • Gestational hypertension    • Gonorrhea    • Peptic ulceration         FAMILY HISTORY:   Family History   Problem Relation Age of Onset   • Hypertension Maternal Grandmother         SOCIAL HISTORY:   Social History     Social History   • Marital status: Single     Spouse name: N/A   • Number of children: N/A   • Years of education: N/A     Occupational History   • Not on file.     Social History Main Topics   • Smoking status: Current Every Day Smoker     Packs/day: 0.50   • Smokeless tobacco: Never Used   • Alcohol use Yes      Comment: rarely   • Drug use: No   • Sexual activity: Yes     Partners: Male     Birth control/ protection: None     Other Topics Concern   • Not on file     Social History Narrative   • No narrative on file        SURGICAL HISTORY:   Past Surgical History:   Procedure Laterality Date   •  SECTION  2012   •  SECTION  2014   •  SECTION WITH TUBAL N/A 2017    Procedure:  SECTION REPEAT WITH TUBAL;  Surgeon: Cinthya Garcia MD;  [facility];  Service:    • NO PAST SURGERIES     • TUBAL  ABDOMINAL LIGATION          CURRENT MEDICATIONS:      Medication List      ASK your doctor about these medications    ibuprofen 600 MG tablet  Commonly known as:  ADVIL,MOTRIN  Take 1 tablet by mouth Every 8 (Eight) Hours As Needed for mild pain   (1-3).     prenatal (CLASSIC) vitamin 28-0.8 MG tablet tablet           ALLERGIES: Coconut oil     PHYSICAL EXAM:   VITAL SIGNS:   Vitals:    09/16/18 0849   BP: 124/77   Pulse: 72   Resp: 16   Temp: 98.2 °F (36.8 °C)   SpO2: 97%      CONSTITUTIONAL: Awake, oriented, appears non-toxic   HENT: Atraumatic, normocephalic, oral mucosa pink and moist, airway patent.   EYES: Conjunctiva clear   NECK: Trachea midline, non-tender, supple   CARDIOVASCULAR: Normal heart rate, Normal rhythm, No murmurs, rubs, gallops   PULMONARY/CHEST: Clear to auscultation, no rhonchi, wheezes, or rales. Symmetrical breath sounds.  ABDOMINAL: Non-distended, soft, mild tenderness in the epigastric abdomen - no rebound or guarding.  NEUROLOGIC: Non-focal, moving all four extremities, no gross sensory or motor deficits.   EXTREMITIES: No clubbing, cyanosis, or edema   SKIN: Warm, Dry, No erythema, No rash     ED COURSE / MEDICAL DECISION MAKING:   Bessie Padilal is a 23 y.o. female who presents to the emergency department for evaluation of abdominal pain, nausea, vomiting, diarrhea.  Patient nondistressed on arrival in the emergency department.  Vital signs are stable on arrival.  Exam does reveal some mild tenderness in the epigastric abdomen.  We'll obtain labs and imaging for further evaluation.  Laboratory tests are largely unremarkable.  CT scan reveals findings consistent with enteritis and also likely right-sided ovarian cyst.  Patient got improvement after some medicines in the emergency department.  We'll continue to treat symptomatically.  Return precautions were discussed.    DECISION TO DISCHARGE/ADMIT: see ED care timeline     FINAL IMPRESSION:   1 -- abdominal pain   2 -- nausea,  vomiting, diarrhea  3 --     Electronically signed by: Patty Sesay MD, 9/16/2018 9:04 AM       Patty Sesay MD  09/16/18 8437

## 2018-09-27 ENCOUNTER — TRANSCRIBE ORDERS (OUTPATIENT)
Dept: ADMINISTRATIVE | Facility: HOSPITAL | Age: 23
End: 2018-09-27

## 2018-09-27 DIAGNOSIS — R20.0 NUMBNESS OF BOTH LOWER EXTREMITIES: Primary | ICD-10-CM

## 2018-09-27 DIAGNOSIS — R51.9 NONINTRACTABLE HEADACHE, UNSPECIFIED CHRONICITY PATTERN, UNSPECIFIED HEADACHE TYPE: ICD-10-CM

## 2018-10-03 ENCOUNTER — HOSPITAL ENCOUNTER (OUTPATIENT)
Dept: MRI IMAGING | Facility: HOSPITAL | Age: 23
Discharge: HOME | End: 2018-10-03
Admitting: FAMILY MEDICINE

## 2018-10-03 ENCOUNTER — APPOINTMENT (OUTPATIENT)
Dept: MRI IMAGING | Facility: HOSPITAL | Age: 23
End: 2018-10-03

## 2018-10-03 DIAGNOSIS — R51.9 NONINTRACTABLE HEADACHE, UNSPECIFIED CHRONICITY PATTERN, UNSPECIFIED HEADACHE TYPE: ICD-10-CM

## 2018-10-03 PROCEDURE — A9577 INJ MULTIHANCE: HCPCS | Performed by: FAMILY MEDICINE

## 2018-10-03 PROCEDURE — 0 GADOBENATE DIMEGLUMINE 529 MG/ML SOLUTION: Performed by: FAMILY MEDICINE

## 2018-10-03 PROCEDURE — 70553 MRI BRAIN STEM W/O & W/DYE: CPT

## 2018-10-03 RX ADMIN — GADOBENATE DIMEGLUMINE 15 ML: 529 INJECTION, SOLUTION INTRAVENOUS at 07:02

## 2018-11-05 ENCOUNTER — HOSPITAL ENCOUNTER (EMERGENCY)
Facility: HOSPITAL | Age: 23
Discharge: HOME | End: 2018-11-05
Attending: EMERGENCY MEDICINE | Admitting: EMERGENCY MEDICINE

## 2018-11-05 VITALS
HEIGHT: 60 IN | BODY MASS INDEX: 35.93 KG/M2 | DIASTOLIC BLOOD PRESSURE: 72 MMHG | SYSTOLIC BLOOD PRESSURE: 117 MMHG | HEART RATE: 93 BPM | OXYGEN SATURATION: 99 % | TEMPERATURE: 98.7 F | RESPIRATION RATE: 18 BRPM | WEIGHT: 183 LBS

## 2018-11-05 DIAGNOSIS — R11.2 NON-INTRACTABLE VOMITING WITH NAUSEA, UNSPECIFIED VOMITING TYPE: Primary | ICD-10-CM

## 2018-11-05 DIAGNOSIS — N39.0 URINARY TRACT INFECTION WITHOUT HEMATURIA, SITE UNSPECIFIED: ICD-10-CM

## 2018-11-05 LAB
B-HCG UR QL: NEGATIVE
BACTERIA UR QL AUTO: ABNORMAL /HPF
BILIRUB UR QL STRIP: NEGATIVE
CLARITY UR: ABNORMAL
COD CRY URNS QL: ABNORMAL /HPF
COLOR UR: YELLOW
GLUCOSE UR STRIP-MCNC: NEGATIVE MG/DL
HGB UR QL STRIP.AUTO: ABNORMAL
HYALINE CASTS UR QL AUTO: ABNORMAL /LPF
KETONES UR QL STRIP: NEGATIVE
LEUKOCYTE ESTERASE UR QL STRIP.AUTO: ABNORMAL
MUCOUS THREADS URNS QL MICRO: ABNORMAL /HPF
NITRITE UR QL STRIP: NEGATIVE
PH UR STRIP.AUTO: 5.5 [PH] (ref 5–8)
PROT UR QL STRIP: ABNORMAL
RBC # UR: ABNORMAL /HPF
REF LAB TEST METHOD: ABNORMAL
SP GR UR STRIP: >=1.03 (ref 1–1.03)
SQUAMOUS #/AREA URNS HPF: ABNORMAL /HPF
UROBILINOGEN UR QL STRIP: ABNORMAL
WBC UR QL AUTO: ABNORMAL /HPF

## 2018-11-05 PROCEDURE — 81025 URINE PREGNANCY TEST: CPT | Performed by: EMERGENCY MEDICINE

## 2018-11-05 PROCEDURE — 81001 URINALYSIS AUTO W/SCOPE: CPT | Performed by: EMERGENCY MEDICINE

## 2018-11-05 PROCEDURE — 99283 EMERGENCY DEPT VISIT LOW MDM: CPT

## 2018-11-05 PROCEDURE — 87077 CULTURE AEROBIC IDENTIFY: CPT | Performed by: EMERGENCY MEDICINE

## 2018-11-05 PROCEDURE — 87186 SC STD MICRODIL/AGAR DIL: CPT | Performed by: EMERGENCY MEDICINE

## 2018-11-05 PROCEDURE — 87086 URINE CULTURE/COLONY COUNT: CPT | Performed by: EMERGENCY MEDICINE

## 2018-11-05 RX ORDER — ONDANSETRON 4 MG/1
4 TABLET, ORALLY DISINTEGRATING ORAL ONCE
Status: COMPLETED | OUTPATIENT
Start: 2018-11-05 | End: 2018-11-05

## 2018-11-05 RX ORDER — ONDANSETRON 4 MG/1
4 TABLET, ORALLY DISINTEGRATING ORAL EVERY 8 HOURS PRN
Qty: 12 TABLET | Refills: 0 | Status: SHIPPED | OUTPATIENT
Start: 2018-11-05 | End: 2019-10-06

## 2018-11-05 RX ORDER — CEPHALEXIN 500 MG/1
500 CAPSULE ORAL 2 TIMES DAILY
Qty: 14 CAPSULE | Refills: 0 | Status: SHIPPED | OUTPATIENT
Start: 2018-11-05 | End: 2019-10-06

## 2018-11-05 RX ADMIN — ONDANSETRON 4 MG: 4 TABLET, ORALLY DISINTEGRATING ORAL at 07:37

## 2018-11-05 NOTE — ED PROVIDER NOTES
TRIAGE CHIEF COMPLAINT:     Nursing and triage notes reviewed    Chief Complaint   Patient presents with   • Urinary Tract Infection   • Vomiting      HPI: Bessie Padilla is a 23 y.o. female who presents to the emergency department complaining of a several day history of nausea and vomiting.  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  Patient had had several days of nonbloody diarrhea which has since resolved.  She is now been vomiting for the past 2-3 days.  Over the past 2 days she's also had some burning with urination and is concerned she is getting a urinary tract infection.  She denies abdominal pain.  Denies coughing, chest pain, shortness of breath.     REVIEW OF SYSTEMS: All other systems reviewed and are negative     PAST MEDICAL HISTORY:   Past Medical History:   Diagnosis Date   • Asthma    • Chlamydia    • Gestational hypertension    • Gonorrhea    • Peptic ulceration         FAMILY HISTORY:   Family History   Problem Relation Age of Onset   • Hypertension Maternal Grandmother         SOCIAL HISTORY:   Social History     Social History   • Marital status: Single     Spouse name: N/A   • Number of children: N/A   • Years of education: N/A     Occupational History   • Not on file.     Social History Main Topics   • Smoking status: Current Every Day Smoker     Packs/day: 0.50   • Smokeless tobacco: Never Used   • Alcohol use Yes      Comment: rarely   • Drug use: No   • Sexual activity: Yes     Partners: Male     Birth control/ protection: None     Other Topics Concern   • Not on file     Social History Narrative   • No narrative on file        SURGICAL HISTORY:   Past Surgical History:   Procedure Laterality Date   •  SECTION  2012   •  SECTION  2014   •  SECTION WITH TUBAL N/A 2017    Procedure:  SECTION REPEAT WITH TUBAL;  Surgeon: Cinthya Garcia MD;  Location: [facility];  Service:    • NO PAST SURGERIES     • TUBAL ABDOMINAL  LIGATION          CURRENT MEDICATIONS:      Medication List      ASK your doctor about these medications    diphenoxylate-atropine 2.5-0.025 MG per tablet  Commonly known as:  LOMOTIL  Take 1 tablet by mouth 4 (Four) Times a Day As Needed for Diarrhea.     ibuprofen 600 MG tablet  Commonly known as:  ADVIL,MOTRIN  Take 1 tablet by mouth Every 8 (Eight) Hours As Needed for mild pain   (1-3).     ondansetron ODT 4 MG disintegrating tablet  Commonly known as:  ZOFRAN-ODT  Take 1 tablet by mouth Every 6 (Six) Hours As Needed for Nausea or   Vomiting.     prenatal (CLASSIC) vitamin 28-0.8 MG tablet tablet     ranitidine 150 MG capsule  Commonly known as:  ZANTAC  Take 1 capsule by mouth 2 (Two) Times a Day.           ALLERGIES: Coconut oil     PHYSICAL EXAM:   VITAL SIGNS:   Vitals:    11/05/18 0639   BP: 105/76   Pulse: 73   Resp: 18   Temp: 98.3 °F (36.8 °C)   SpO2: 96%      CONSTITUTIONAL: Awake, oriented, appears non-toxic   HENT: Atraumatic, normocephalic, oral mucosa pink and moist, airway patent.   EYES: Conjunctiva clear   NECK: Trachea midline   CARDIOVASCULAR: Normal heart rate, Normal rhythm, No murmurs, rubs, gallops   PULMONARY/CHEST: Clear to auscultation, no rhonchi, wheezes, or rales. Symmetrical breath sounds.  ABDOMINAL: Non-distended, soft, non-tender - no rebound or guarding.  NEUROLOGIC: Non-focal, moving all four extremities, no gross sensory or motor deficits.   EXTREMITIES: No clubbing, cyanosis, or edema   SKIN: Warm, Dry, No erythema, No rash     ED COURSE / MEDICAL DECISION MAKING:   Bessie Padilla is a 23 y.o. female who presents to the emergency department for evaluation of nausea and vomiting.  Patient appears well on arrival.  Vital signs are stable.  Physical exam is largely unremarkable.  Urinary sample obtained and does reveal signs of infection.  Patient will be started on antibiotics patient was given antinausea medication in the emergency department.  Patient able to tolerate by  mouth without difficulty.  We'll continue to treat symptomatically as an outpatient.     DECISION TO DISCHARGE/ADMIT: see ED care timeline     FINAL IMPRESSION:   1 -- nausea and vomiting   2 -- urinary tract infection  3 --     Electronically signed by: Patty Sesay MD, 11/5/2018 8:20 AM       Patty Sesay MD  11/05/18 0824

## 2018-11-07 LAB — BACTERIA SPEC AEROBE CULT: ABNORMAL

## 2019-01-02 ENCOUNTER — HOSPITAL ENCOUNTER (EMERGENCY)
Facility: HOSPITAL | Age: 24
Discharge: HOME | End: 2019-01-02
Attending: EMERGENCY MEDICINE | Admitting: STUDENT IN AN ORGANIZED HEALTH CARE EDUCATION/TRAINING PROGRAM

## 2019-01-02 ENCOUNTER — APPOINTMENT (OUTPATIENT)
Dept: CT IMAGING | Facility: HOSPITAL | Age: 24
End: 2019-01-02

## 2019-01-02 VITALS
HEART RATE: 89 BPM | TEMPERATURE: 97.7 F | OXYGEN SATURATION: 98 % | SYSTOLIC BLOOD PRESSURE: 110 MMHG | DIASTOLIC BLOOD PRESSURE: 75 MMHG | HEIGHT: 60 IN | RESPIRATION RATE: 18 BRPM | BODY MASS INDEX: 34.16 KG/M2 | WEIGHT: 174 LBS

## 2019-01-02 DIAGNOSIS — R11.2 NON-INTRACTABLE VOMITING WITH NAUSEA, UNSPECIFIED VOMITING TYPE: Primary | ICD-10-CM

## 2019-01-02 LAB
ALBUMIN SERPL-MCNC: 4.1 G/DL (ref 3.5–5)
ALBUMIN/GLOB SERPL: 1.5 G/DL (ref 1–2)
ALP SERPL-CCNC: 98 U/L (ref 38–126)
ALT SERPL W P-5'-P-CCNC: 23 U/L (ref 13–69)
ANION GAP SERPL CALCULATED.3IONS-SCNC: 9.9 MMOL/L (ref 10–20)
AST SERPL-CCNC: 20 U/L (ref 15–46)
B-HCG UR QL: NEGATIVE
BACTERIA UR QL AUTO: ABNORMAL /HPF
BASOPHILS # BLD AUTO: 0.04 10*3/MM3 (ref 0–0.2)
BASOPHILS NFR BLD AUTO: 0.6 % (ref 0–2.5)
BILIRUB SERPL-MCNC: 0.6 MG/DL (ref 0.2–1.3)
BILIRUB UR QL STRIP: NEGATIVE
BUN BLD-MCNC: 8 MG/DL (ref 7–20)
BUN/CREAT SERPL: 13.3 (ref 7.1–23.5)
CALCIUM SPEC-SCNC: 8.7 MG/DL (ref 8.4–10.2)
CHLORIDE SERPL-SCNC: 107 MMOL/L (ref 98–107)
CLARITY UR: CLEAR
CO2 SERPL-SCNC: 26 MMOL/L (ref 26–30)
COLOR UR: YELLOW
CREAT BLD-MCNC: 0.6 MG/DL (ref 0.6–1.3)
DEPRECATED RDW RBC AUTO: 46.6 FL (ref 37–54)
EOSINOPHIL # BLD AUTO: 0.38 10*3/MM3 (ref 0–0.7)
EOSINOPHIL NFR BLD AUTO: 5.6 % (ref 0–7)
ERYTHROCYTE [DISTWIDTH] IN BLOOD BY AUTOMATED COUNT: 15.2 % (ref 11.5–14.5)
GFR SERPL CREATININE-BSD FRML MDRD: 149 ML/MIN/1.73
GLOBULIN UR ELPH-MCNC: 2.7 GM/DL
GLUCOSE BLD-MCNC: 90 MG/DL (ref 74–98)
GLUCOSE UR STRIP-MCNC: NEGATIVE MG/DL
HCT VFR BLD AUTO: 35.2 % (ref 37–47)
HGB BLD-MCNC: 11.6 G/DL (ref 12–16)
HGB UR QL STRIP.AUTO: ABNORMAL
HYALINE CASTS UR QL AUTO: ABNORMAL /LPF
IMM GRANULOCYTES # BLD AUTO: 0.02 10*3/MM3 (ref 0–0.06)
IMM GRANULOCYTES NFR BLD AUTO: 0.3 % (ref 0–0.6)
KETONES UR QL STRIP: NEGATIVE
LEUKOCYTE ESTERASE UR QL STRIP.AUTO: NEGATIVE
LIPASE SERPL-CCNC: 35 U/L (ref 23–300)
LYMPHOCYTES # BLD AUTO: 1.9 10*3/MM3 (ref 0.6–3.4)
LYMPHOCYTES NFR BLD AUTO: 27.8 % (ref 10–50)
MCH RBC QN AUTO: 28 PG (ref 27–31)
MCHC RBC AUTO-ENTMCNC: 33 G/DL (ref 30–37)
MCV RBC AUTO: 84.8 FL (ref 81–99)
MONOCYTES # BLD AUTO: 0.45 10*3/MM3 (ref 0–0.9)
MONOCYTES NFR BLD AUTO: 6.6 % (ref 0–12)
MUCOUS THREADS URNS QL MICRO: ABNORMAL /HPF
NEUTROPHILS # BLD AUTO: 4.05 10*3/MM3 (ref 2–6.9)
NEUTROPHILS NFR BLD AUTO: 59.1 % (ref 37–80)
NITRITE UR QL STRIP: NEGATIVE
NRBC BLD AUTO-RTO: 0 /100 WBC (ref 0–0)
PH UR STRIP.AUTO: 7 [PH] (ref 5–8)
PLATELET # BLD AUTO: 231 10*3/MM3 (ref 130–400)
PMV BLD AUTO: 9.6 FL (ref 6–12)
POTASSIUM BLD-SCNC: 3.9 MMOL/L (ref 3.5–5.1)
PROT SERPL-MCNC: 6.8 G/DL (ref 6.3–8.2)
PROT UR QL STRIP: NEGATIVE
RBC # BLD AUTO: 4.15 10*6/MM3 (ref 4.2–5.4)
RBC # UR: ABNORMAL /HPF
REF LAB TEST METHOD: ABNORMAL
SODIUM BLD-SCNC: 139 MMOL/L (ref 137–145)
SP GR UR STRIP: 1.02 (ref 1–1.03)
SQUAMOUS #/AREA URNS HPF: ABNORMAL /HPF
UROBILINOGEN UR QL STRIP: ABNORMAL
WBC NRBC COR # BLD: 6.84 10*3/MM3 (ref 4.8–10.8)
WBC UR QL AUTO: ABNORMAL /HPF

## 2019-01-02 PROCEDURE — 81025 URINE PREGNANCY TEST: CPT | Performed by: NURSE PRACTITIONER

## 2019-01-02 PROCEDURE — 74177 CT ABD & PELVIS W/CONTRAST: CPT

## 2019-01-02 PROCEDURE — 80053 COMPREHEN METABOLIC PANEL: CPT | Performed by: NURSE PRACTITIONER

## 2019-01-02 PROCEDURE — 85025 COMPLETE CBC W/AUTO DIFF WBC: CPT | Performed by: NURSE PRACTITIONER

## 2019-01-02 PROCEDURE — 25010000002 IOPAMIDOL 61 % SOLUTION: Performed by: EMERGENCY MEDICINE

## 2019-01-02 PROCEDURE — 83690 ASSAY OF LIPASE: CPT | Performed by: NURSE PRACTITIONER

## 2019-01-02 PROCEDURE — 96374 THER/PROPH/DIAG INJ IV PUSH: CPT

## 2019-01-02 PROCEDURE — 99283 EMERGENCY DEPT VISIT LOW MDM: CPT

## 2019-01-02 PROCEDURE — 81001 URINALYSIS AUTO W/SCOPE: CPT | Performed by: NURSE PRACTITIONER

## 2019-01-02 PROCEDURE — 25010000002 ONDANSETRON PER 1 MG: Performed by: NURSE PRACTITIONER

## 2019-01-02 RX ORDER — ONDANSETRON 4 MG/1
4 TABLET, ORALLY DISINTEGRATING ORAL EVERY 6 HOURS PRN
Qty: 20 TABLET | Refills: 0 | Status: SHIPPED | OUTPATIENT
Start: 2019-01-02 | End: 2019-10-06

## 2019-01-02 RX ORDER — ONDANSETRON 2 MG/ML
4 INJECTION INTRAMUSCULAR; INTRAVENOUS ONCE
Status: COMPLETED | OUTPATIENT
Start: 2019-01-02 | End: 2019-01-02

## 2019-01-02 RX ORDER — SODIUM CHLORIDE 0.9 % (FLUSH) 0.9 %
10 SYRINGE (ML) INJECTION AS NEEDED
Status: DISCONTINUED | OUTPATIENT
Start: 2019-01-02 | End: 2019-01-02 | Stop reason: HOSPADM

## 2019-01-02 RX ADMIN — IOPAMIDOL 100 ML: 612 INJECTION, SOLUTION INTRAVENOUS at 19:02

## 2019-01-02 RX ADMIN — SODIUM CHLORIDE 1000 ML: 9 INJECTION, SOLUTION INTRAVENOUS at 17:14

## 2019-01-02 RX ADMIN — ONDANSETRON 4 MG: 2 INJECTION INTRAMUSCULAR; INTRAVENOUS at 17:15

## 2019-01-02 NOTE — ED NOTES
"PT STATES 'I DON'T HAVE TO PEE RIGHT NOW.\" WHEN ASKED FOR A URINE SPECIMEN.      Saima Mathew RN  01/02/19 9924    "

## 2019-01-03 NOTE — ED PROVIDER NOTES
Subjective   History of Present Illness  Is a 23-year-old female comes in today complaining of nausea and vomiting ×4 episodes today.  With periumbilical abdominal pain.  She reports that 2 episodes she was vomiting blood.  She denies any fever chills or diarrhea.  Review of Systems   Constitutional: Negative.    HENT: Negative.    Eyes: Negative.    Respiratory: Negative.    Cardiovascular: Negative.    Gastrointestinal: Positive for abdominal pain, nausea and vomiting.   Endocrine: Negative.    Genitourinary: Negative.    Musculoskeletal: Negative.    Skin: Negative.    Allergic/Immunologic: Negative.    Neurological: Negative.    Hematological: Negative.    Psychiatric/Behavioral: Negative.    All other systems reviewed and are negative.      Past Medical History:   Diagnosis Date   • Asthma    • Chlamydia    • Gestational hypertension    • Gonorrhea    • Peptic ulceration        Allergies   Allergen Reactions   • Coconut Oil Anaphylaxis       Past Surgical History:   Procedure Laterality Date   •  SECTION  2012   •  SECTION  2014   •  SECTION WITH TUBAL N/A 2017    Procedure:  SECTION REPEAT WITH TUBAL;  Surgeon: Cinthya Garcia MD;  Location: [facility];  Service:    • NO PAST SURGERIES     • TUBAL ABDOMINAL LIGATION         Family History   Problem Relation Age of Onset   • Hypertension Maternal Grandmother        Social History     Socioeconomic History   • Marital status: Single     Spouse name: Not on file   • Number of children: Not on file   • Years of education: Not on file   • Highest education level: Not on file   Tobacco Use   • Smoking status: Current Every Day Smoker     Packs/day: 0.50   • Smokeless tobacco: Never Used   Substance and Sexual Activity   • Alcohol use: Yes     Comment: rarely   • Drug use: No   • Sexual activity: Yes     Partners: Male     Birth control/protection: None           Objective   Physical Exam   Constitutional: She appears  well-developed and well-nourished.   Nursing note and vitals reviewed.  GEN: No acute distress  Head: Normocephalic, atraumatic  Eyes: Pupils equal round reactive to light  ENT: Posterior pharynx normal in appearance, oral mucosa is moist  Chest: Nontender to palpation  Cardiovascular: Regular rate  Lungs: Clear to auscultation bilaterally  Abdomen: Soft, tender periumbilical, nondistended, no peritoneal signs  Extremities: No edema, normal appearance  Neuro: GCS 15  Psych: Mood and affect are appropriate      Procedures           ED Course                  MDM  Number of Diagnoses or Management Options     Amount and/or Complexity of Data Reviewed  Clinical lab tests: reviewed and ordered  Tests in the radiology section of CPT®: reviewed and ordered  Decide to obtain previous medical records or to obtain history from someone other than the patient: yes  Review and summarize past medical records: yes    Risk of Complications, Morbidity, and/or Mortality  Presenting problems: moderate  Diagnostic procedures: moderate  Management options: moderate          Final diagnoses:   Non-intractable vomiting with nausea, unspecified vomiting type            Lucila José, RADHA  01/02/19 2002

## 2019-05-29 ENCOUNTER — HOSPITAL ENCOUNTER (EMERGENCY)
Facility: HOSPITAL | Age: 24
Discharge: HOME | End: 2019-05-29
Attending: EMERGENCY MEDICINE | Admitting: EMERGENCY MEDICINE

## 2019-05-29 VITALS
SYSTOLIC BLOOD PRESSURE: 106 MMHG | WEIGHT: 186 LBS | RESPIRATION RATE: 16 BRPM | OXYGEN SATURATION: 97 % | HEART RATE: 77 BPM | HEIGHT: 60 IN | BODY MASS INDEX: 36.52 KG/M2 | DIASTOLIC BLOOD PRESSURE: 53 MMHG | TEMPERATURE: 98.1 F

## 2019-05-29 DIAGNOSIS — R19.7 VOMITING AND DIARRHEA: Primary | ICD-10-CM

## 2019-05-29 DIAGNOSIS — R11.10 VOMITING AND DIARRHEA: Primary | ICD-10-CM

## 2019-05-29 DIAGNOSIS — N30.90 CYSTITIS: ICD-10-CM

## 2019-05-29 LAB
B-HCG UR QL: NEGATIVE
BACTERIA UR QL AUTO: ABNORMAL /HPF
BILIRUB UR QL STRIP: NEGATIVE
CLARITY UR: ABNORMAL
COLOR UR: ABNORMAL
GLUCOSE UR STRIP-MCNC: NEGATIVE MG/DL
HGB UR QL STRIP.AUTO: ABNORMAL
HYALINE CASTS UR QL AUTO: ABNORMAL /LPF
KETONES UR QL STRIP: NEGATIVE
LEUKOCYTE ESTERASE UR QL STRIP.AUTO: ABNORMAL
NITRITE UR QL STRIP: POSITIVE
PH UR STRIP.AUTO: 5.5 [PH] (ref 5–8)
PROT UR QL STRIP: NEGATIVE
RBC # UR: ABNORMAL /HPF
REF LAB TEST METHOD: ABNORMAL
SP GR UR STRIP: 1.02 (ref 1–1.03)
SQUAMOUS #/AREA URNS HPF: ABNORMAL /HPF
UROBILINOGEN UR QL STRIP: ABNORMAL
WBC UR QL AUTO: ABNORMAL /HPF

## 2019-05-29 PROCEDURE — 81025 URINE PREGNANCY TEST: CPT | Performed by: PHYSICIAN ASSISTANT

## 2019-05-29 PROCEDURE — 81001 URINALYSIS AUTO W/SCOPE: CPT | Performed by: PHYSICIAN ASSISTANT

## 2019-05-29 PROCEDURE — 87086 URINE CULTURE/COLONY COUNT: CPT | Performed by: PHYSICIAN ASSISTANT

## 2019-05-29 PROCEDURE — 99283 EMERGENCY DEPT VISIT LOW MDM: CPT

## 2019-05-29 RX ORDER — CEPHALEXIN 500 MG/1
500 CAPSULE ORAL 2 TIMES DAILY
Qty: 14 CAPSULE | Refills: 0 | Status: SHIPPED | OUTPATIENT
Start: 2019-05-29 | End: 2019-06-05

## 2019-05-29 RX ORDER — PHENAZOPYRIDINE HYDROCHLORIDE 200 MG/1
200 TABLET, FILM COATED ORAL 3 TIMES DAILY PRN
Qty: 6 TABLET | Refills: 0 | Status: SHIPPED | OUTPATIENT
Start: 2019-05-29 | End: 2019-05-31

## 2019-05-29 RX ORDER — ONDANSETRON 4 MG/1
4 TABLET, ORALLY DISINTEGRATING ORAL EVERY 8 HOURS PRN
Qty: 6 TABLET | Refills: 0 | Status: SHIPPED | OUTPATIENT
Start: 2019-05-29 | End: 2019-05-31

## 2019-05-29 RX ORDER — ONDANSETRON 4 MG/1
4 TABLET, ORALLY DISINTEGRATING ORAL ONCE
Status: COMPLETED | OUTPATIENT
Start: 2019-05-29 | End: 2019-05-29

## 2019-05-29 RX ORDER — CEPHALEXIN 250 MG/1
500 CAPSULE ORAL ONCE
Status: COMPLETED | OUTPATIENT
Start: 2019-05-29 | End: 2019-05-29

## 2019-05-29 RX ADMIN — ONDANSETRON 4 MG: 4 TABLET, ORALLY DISINTEGRATING ORAL at 14:00

## 2019-05-29 RX ADMIN — CEPHALEXIN 500 MG: 250 CAPSULE ORAL at 15:19

## 2019-05-31 LAB — BACTERIA SPEC AEROBE CULT: NORMAL

## 2019-10-04 ENCOUNTER — OFFICE VISIT (OUTPATIENT)
Dept: OBSTETRICS AND GYNECOLOGY | Facility: CLINIC | Age: 24
End: 2019-10-04

## 2019-10-04 VITALS
DIASTOLIC BLOOD PRESSURE: 62 MMHG | HEIGHT: 60 IN | WEIGHT: 181 LBS | BODY MASS INDEX: 35.53 KG/M2 | SYSTOLIC BLOOD PRESSURE: 118 MMHG

## 2019-10-04 DIAGNOSIS — N94.6 DYSMENORRHEA: ICD-10-CM

## 2019-10-04 DIAGNOSIS — N97.1 TUBAL INFERTILITY IN FEMALE: ICD-10-CM

## 2019-10-04 DIAGNOSIS — N92.1 MENORRHAGIA WITH IRREGULAR CYCLE: Primary | ICD-10-CM

## 2019-10-04 PROCEDURE — 99214 OFFICE O/P EST MOD 30 MIN: CPT | Performed by: OBSTETRICS & GYNECOLOGY

## 2019-10-04 NOTE — PROGRESS NOTES
Subjective  Chief Complaint   Patient presents with   • Menorrhagia     Patient complains of heavy/painful menses x 2 years. Patient advised changing 2 pads with tampon every 1-2 hours, passing large clots.    • Dysmenorrhea     Patient is 24 y.o.  here for evaluation of her heavy and painful menses which are irregular.  Patient reports the onset of heavy and painful menses since her tubal ligation in 2017.  Patient had placement of Filshie clips at the time of her  section.  Patient reports since that time her menstrual cycles have been irregular.  Patient reports she will usually have one per month but may be every 3 weeks.  Patient reports her menstrual cycles will last 1 to 2 weeks.  Patient reports they are extremely heavy in nature.  Patient reports changing 2 pads and a tampon every 1-2 hours.  Patient reports they are heavy throughout the cycle.  Patient also reports having significant pain with severe cramping prior to her menses during and for several days after her cycle.  Patient reports she takes Midol, ibuprofen, and Tylenol with continued pain.  Patient reports her primary care physician tried her on oral contraceptives for approximately 6 months with no improvement in either her pain or bleeding.  Patient denies being dizzy or lightheaded.  Patient denies any other symptoms.  Patient has not had any recent laboratory assessment.  Patient does reports she had a Pap smear 1 year ago which was normal.  Patient does report prior to her tubal ligation being on Depo-Provera many years ago.  Patient did not have any bleeding with Depo-Provera.  Patient has also been on contraceptive patch but reports the patch would not stick.  Patient also reports being on multiple oral contraceptives in the past but cannot remember to take them consistently.  Patient also is inquiring regarding reversal of her tubal ligation.  Patient is here with her significant other.  Patient reports she is  certain she is desiring tubal reversal.  Patient would like to see specialist regarding the procedure.    History  Past Medical History:   Diagnosis Date   • Asthma    • Chlamydia    • Gestational hypertension    • Gonorrhea    • Peptic ulceration      Current Outpatient Medications on File Prior to Visit   Medication Sig Dispense Refill   • cephalexin (KEFLEX) 500 MG capsule Take 1 capsule by mouth 2 (Two) Times a Day. 14 capsule 0   • diphenoxylate-atropine (LOMOTIL) 2.5-0.025 MG per tablet Take 1 tablet by mouth 4 (Four) Times a Day As Needed for Diarrhea. 12 tablet 0   • ibuprofen (ADVIL,MOTRIN) 600 MG tablet Take 1 tablet by mouth Every 8 (Eight) Hours As Needed for mild pain (1-3). 60 tablet 0   • ondansetron ODT (ZOFRAN-ODT) 4 MG disintegrating tablet Take 1 tablet by mouth Every 6 (Six) Hours As Needed for Nausea or Vomiting. 12 tablet 0   • ondansetron ODT (ZOFRAN-ODT) 4 MG disintegrating tablet Take 1 tablet by mouth Every 8 (Eight) Hours As Needed for Nausea or Vomiting. 12 tablet 0   • ondansetron ODT (ZOFRAN-ODT) 4 MG disintegrating tablet Take 1 tablet by mouth Every 6 (Six) Hours As Needed for Nausea or Vomiting. 20 tablet 0   • Prenatal Vit-Fe Fumarate-FA (PRENATAL, CLASSIC, VITAMIN) 28-0.8 MG tablet tablet Take  by mouth Daily.       No current facility-administered medications on file prior to visit.      Allergies   Allergen Reactions   • Coconut Oil Anaphylaxis     Past Surgical History:   Procedure Laterality Date   •  SECTION  2012   •  SECTION  2014   •  SECTION WITH TUBAL N/A 2017    Procedure:  SECTION REPEAT WITH TUBAL;  Surgeon: Cinthya Garcia MD;  Location: [facility];  Service:    • NO PAST SURGERIES     • TUBAL ABDOMINAL LIGATION       Family History   Problem Relation Age of Onset   • Hypertension Maternal Grandmother      Social History     Socioeconomic History   • Marital status: Single     Spouse name: Not on file   • Number of  "children: Not on file   • Years of education: Not on file   • Highest education level: Not on file   Tobacco Use   • Smoking status: Current Every Day Smoker     Packs/day: 0.25     Types: Cigarettes   • Smokeless tobacco: Never Used   Substance and Sexual Activity   • Alcohol use: Yes     Comment: rarely   • Drug use: No   • Sexual activity: Yes     Partners: Male     Birth control/protection: Surgical     Review of Systems  All systems were reviewed and negative except for:  Genitourinary: postivie for  abnormal menstrual bleeding and pelvic pain     Objective  Vitals:    10/04/19 1013   BP: 118/62   Weight: 82.1 kg (181 lb)   Height: 152.4 cm (60\")     Physical Exam:  General Appearance: alert, appears stated age and cooperative  Head: normocephalic, without obvious abnormality and atraumatic  Eyes: lids and lashes normal, conjunctivae and sclerae normal, no icterus, no pallor, corneas clear and PERRLA  Ears: ears appear intact with no abnormalities noted  Nose: nares normal, septum midline, mucosa normal and no drainage  Neck: suppple, trachea midline and no thyromegaly  Lungs: clear to auscultation, respirations regular, respirations even and respirations unlabored  Heart: regular rhythm and normal rate, normal S1, S2, no murmur, gallop, or rubs and no click  Breasts: Not performed.  Abdomen: normal bowel sounds, no masses, no hepatomegaly, no splenomegaly, soft non-tender, no guarding and no rebound tenderness  Pelvic: Not performed.  Extremities: moves extremities well, no edema, no cyanosis and no redness  Skin: no bleeding, bruising or rash and no lesions noted  Lymph Nodes: no palpable adenopathy  Neuro: CN II-X grossly intact; sensation intact  Psych: normal mood and affect, oriented to person, time and place, thought content organized and appropriate judgment  Lab Review   No data reviewed    Imaging   No data reviewed    Decision to Obtain Medical Records  No    Summary of Medical " Records  No    Assessment/Plan  Problem List Items Addressed This Visit     None      Visit Diagnoses     Menorrhagia with irregular cycle    -  Primary New  We will obtain the following labs today as noted.  Patient is to call for the results.  We will schedule patient for transvaginal ultrasound as well for further evaluation.  Plan pending results.    Relevant Orders    CBC (No Diff) (Completed)    Thyroid Panel With TSH (Completed)    Iron Profile    US Non-ob Transvaginal    Dysmenorrhea    New  Patient with new onset dysmenorrhea since her tubal ligation.  Labs will be obtained today as noted.  We will also schedule patient for transvaginal ultrasound for further evaluation of her dysmenorrhea.  Plan pending results.    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  Will refer patient to reproductive endocrinologist for further evaluation and discussion of a tubal reversal.    Relevant Orders    Ambulatory Referral to Infertility (Completed)        Follow up as discussed/scheduled  Note: Speech recognition transcription software may have been used to dictate portions of this document.  An attempt at proofreading has been made though minor errors in transcription may still be present.  This note was electronically signed.  Cinthya Garcia M.D.

## 2019-10-05 LAB
ERYTHROCYTE [DISTWIDTH] IN BLOOD BY AUTOMATED COUNT: 12.9 % (ref 12.3–15.4)
FT4I SERPL CALC-MCNC: 2.6 (ref 1.2–4.9)
HCT VFR BLD AUTO: 38.2 % (ref 34–46.6)
HGB BLD-MCNC: 12.9 G/DL (ref 12–15.9)
MCH RBC QN AUTO: 30.9 PG (ref 26.6–33)
MCHC RBC AUTO-ENTMCNC: 33.8 G/DL (ref 31.5–35.7)
MCV RBC AUTO: 91.6 FL (ref 79–97)
PLATELET # BLD AUTO: 280 10*3/MM3 (ref 140–450)
RBC # BLD AUTO: 4.17 10*6/MM3 (ref 3.77–5.28)
T3RU NFR SERPL: 31 % (ref 24–39)
T4 SERPL-MCNC: 8.5 UG/DL (ref 4.5–12)
TSH SERPL DL<=0.005 MIU/L-ACNC: 2.08 UIU/ML (ref 0.45–4.5)
WBC # BLD AUTO: 9.49 10*3/MM3 (ref 3.4–10.8)

## 2019-10-09 ENCOUNTER — RESULTS ENCOUNTER (OUTPATIENT)
Dept: OBSTETRICS AND GYNECOLOGY | Facility: CLINIC | Age: 24
End: 2019-10-09

## 2019-10-09 DIAGNOSIS — N92.1 MENORRHAGIA WITH IRREGULAR CYCLE: ICD-10-CM

## 2020-02-16 ENCOUNTER — HOSPITAL ENCOUNTER (EMERGENCY)
Facility: HOSPITAL | Age: 25
Discharge: HOME | End: 2020-02-16
Attending: EMERGENCY MEDICINE | Admitting: EMERGENCY MEDICINE

## 2020-02-16 ENCOUNTER — APPOINTMENT (OUTPATIENT)
Dept: ULTRASOUND IMAGING | Facility: HOSPITAL | Age: 25
End: 2020-02-16

## 2020-02-16 VITALS
DIASTOLIC BLOOD PRESSURE: 69 MMHG | HEIGHT: 60 IN | TEMPERATURE: 97.7 F | SYSTOLIC BLOOD PRESSURE: 116 MMHG | BODY MASS INDEX: 34.4 KG/M2 | OXYGEN SATURATION: 99 % | WEIGHT: 175.2 LBS | RESPIRATION RATE: 18 BRPM | HEART RATE: 95 BPM

## 2020-02-16 DIAGNOSIS — N94.6 PAINFUL MENSTRUAL PERIODS: ICD-10-CM

## 2020-02-16 DIAGNOSIS — R10.30 LOWER ABDOMINAL PAIN: Primary | ICD-10-CM

## 2020-02-16 LAB
ALBUMIN SERPL-MCNC: 4.5 G/DL (ref 3.5–5.2)
ALBUMIN/GLOB SERPL: 1.3 G/DL
ALP SERPL-CCNC: 106 U/L (ref 39–117)
ALT SERPL W P-5'-P-CCNC: 8 U/L (ref 1–33)
ANION GAP SERPL CALCULATED.3IONS-SCNC: 12.8 MMOL/L (ref 5–15)
AST SERPL-CCNC: 13 U/L (ref 1–32)
B-HCG UR QL: NEGATIVE
BACTERIA UR QL AUTO: ABNORMAL /HPF
BASOPHILS # BLD AUTO: 0.05 10*3/MM3 (ref 0–0.2)
BASOPHILS NFR BLD AUTO: 0.5 % (ref 0–1.5)
BILIRUB SERPL-MCNC: 1.1 MG/DL (ref 0.2–1.2)
BILIRUB UR QL STRIP: NEGATIVE
BUN BLD-MCNC: 9 MG/DL (ref 6–20)
BUN/CREAT SERPL: 11.3 (ref 7–25)
CALCIUM SPEC-SCNC: 9.5 MG/DL (ref 8.6–10.5)
CHLORIDE SERPL-SCNC: 102 MMOL/L (ref 98–107)
CLARITY UR: ABNORMAL
CO2 SERPL-SCNC: 24.2 MMOL/L (ref 22–29)
COLOR UR: YELLOW
CREAT BLD-MCNC: 0.8 MG/DL (ref 0.57–1)
DEPRECATED RDW RBC AUTO: 44.1 FL (ref 37–54)
EOSINOPHIL # BLD AUTO: 0.15 10*3/MM3 (ref 0–0.4)
EOSINOPHIL NFR BLD AUTO: 1.4 % (ref 0.3–6.2)
ERYTHROCYTE [DISTWIDTH] IN BLOOD BY AUTOMATED COUNT: 12.9 % (ref 12.3–15.4)
GFR SERPL CREATININE-BSD FRML MDRD: 106 ML/MIN/1.73
GLOBULIN UR ELPH-MCNC: 3.5 GM/DL
GLUCOSE BLD-MCNC: 93 MG/DL (ref 65–99)
GLUCOSE UR STRIP-MCNC: NEGATIVE MG/DL
HCT VFR BLD AUTO: 42.2 % (ref 34–46.6)
HGB BLD-MCNC: 14 G/DL (ref 12–15.9)
HGB UR QL STRIP.AUTO: ABNORMAL
HYALINE CASTS UR QL AUTO: ABNORMAL /LPF
IMM GRANULOCYTES # BLD AUTO: 0.03 10*3/MM3 (ref 0–0.05)
IMM GRANULOCYTES NFR BLD AUTO: 0.3 % (ref 0–0.5)
KETONES UR QL STRIP: ABNORMAL
LEUKOCYTE ESTERASE UR QL STRIP.AUTO: NEGATIVE
LIPASE SERPL-CCNC: 24 U/L (ref 13–60)
LYMPHOCYTES # BLD AUTO: 1.82 10*3/MM3 (ref 0.7–3.1)
LYMPHOCYTES NFR BLD AUTO: 16.5 % (ref 19.6–45.3)
MCH RBC QN AUTO: 31.1 PG (ref 26.6–33)
MCHC RBC AUTO-ENTMCNC: 33.2 G/DL (ref 31.5–35.7)
MCV RBC AUTO: 93.8 FL (ref 79–97)
MONOCYTES # BLD AUTO: 0.54 10*3/MM3 (ref 0.1–0.9)
MONOCYTES NFR BLD AUTO: 4.9 % (ref 5–12)
NEUTROPHILS # BLD AUTO: 8.43 10*3/MM3 (ref 1.7–7)
NEUTROPHILS NFR BLD AUTO: 76.4 % (ref 42.7–76)
NITRITE UR QL STRIP: NEGATIVE
NRBC BLD AUTO-RTO: 0 /100 WBC (ref 0–0.2)
PH UR STRIP.AUTO: 7 [PH] (ref 5–8)
PLATELET # BLD AUTO: 283 10*3/MM3 (ref 140–450)
PMV BLD AUTO: 9.4 FL (ref 6–12)
POTASSIUM BLD-SCNC: 3.6 MMOL/L (ref 3.5–5.2)
PROT SERPL-MCNC: 8 G/DL (ref 6–8.5)
PROT UR QL STRIP: NEGATIVE
RBC # BLD AUTO: 4.5 10*6/MM3 (ref 3.77–5.28)
RBC # UR: ABNORMAL /HPF
REF LAB TEST METHOD: ABNORMAL
SODIUM BLD-SCNC: 139 MMOL/L (ref 136–145)
SP GR UR STRIP: >=1.03 (ref 1–1.03)
SQUAMOUS #/AREA URNS HPF: ABNORMAL /HPF
UROBILINOGEN UR QL STRIP: ABNORMAL
WBC NRBC COR # BLD: 11.02 10*3/MM3 (ref 3.4–10.8)
WBC UR QL AUTO: ABNORMAL /HPF

## 2020-02-16 PROCEDURE — 85025 COMPLETE CBC W/AUTO DIFF WBC: CPT | Performed by: PHYSICIAN ASSISTANT

## 2020-02-16 PROCEDURE — 83690 ASSAY OF LIPASE: CPT | Performed by: PHYSICIAN ASSISTANT

## 2020-02-16 PROCEDURE — 80053 COMPREHEN METABOLIC PANEL: CPT | Performed by: PHYSICIAN ASSISTANT

## 2020-02-16 PROCEDURE — 25010000002 KETOROLAC TROMETHAMINE PER 15 MG: Performed by: PHYSICIAN ASSISTANT

## 2020-02-16 PROCEDURE — 99283 EMERGENCY DEPT VISIT LOW MDM: CPT

## 2020-02-16 PROCEDURE — 81001 URINALYSIS AUTO W/SCOPE: CPT | Performed by: PHYSICIAN ASSISTANT

## 2020-02-16 PROCEDURE — 81025 URINE PREGNANCY TEST: CPT | Performed by: PHYSICIAN ASSISTANT

## 2020-02-16 PROCEDURE — 25010000002 ONDANSETRON PER 1 MG: Performed by: PHYSICIAN ASSISTANT

## 2020-02-16 PROCEDURE — 96374 THER/PROPH/DIAG INJ IV PUSH: CPT

## 2020-02-16 PROCEDURE — 96375 TX/PRO/DX INJ NEW DRUG ADDON: CPT

## 2020-02-16 PROCEDURE — 76830 TRANSVAGINAL US NON-OB: CPT

## 2020-02-16 RX ORDER — KETOROLAC TROMETHAMINE 30 MG/ML
15 INJECTION, SOLUTION INTRAMUSCULAR; INTRAVENOUS ONCE
Status: COMPLETED | OUTPATIENT
Start: 2020-02-16 | End: 2020-02-16

## 2020-02-16 RX ORDER — ONDANSETRON 2 MG/ML
4 INJECTION INTRAMUSCULAR; INTRAVENOUS ONCE
Status: COMPLETED | OUTPATIENT
Start: 2020-02-16 | End: 2020-02-16

## 2020-02-16 RX ORDER — ONDANSETRON 4 MG/1
4 TABLET, ORALLY DISINTEGRATING ORAL EVERY 6 HOURS PRN
Qty: 8 TABLET | Refills: 0 | Status: SHIPPED | OUTPATIENT
Start: 2020-02-16 | End: 2020-02-18

## 2020-02-16 RX ORDER — SODIUM CHLORIDE 0.9 % (FLUSH) 0.9 %
10 SYRINGE (ML) INJECTION AS NEEDED
Status: DISCONTINUED | OUTPATIENT
Start: 2020-02-16 | End: 2020-02-16 | Stop reason: HOSPADM

## 2020-02-16 RX ADMIN — ONDANSETRON 4 MG: 2 INJECTION INTRAMUSCULAR; INTRAVENOUS at 20:11

## 2020-02-16 RX ADMIN — SODIUM CHLORIDE 1000 ML: 9 INJECTION, SOLUTION INTRAVENOUS at 19:07

## 2020-02-16 RX ADMIN — KETOROLAC TROMETHAMINE 15 MG: 30 INJECTION, SOLUTION INTRAMUSCULAR; INTRAVENOUS at 20:11

## 2020-02-16 NOTE — ED PROVIDER NOTES
Subjective   Patient is a 24-year-old female history of asthma, chlamydia, gestational hypertension, gonorrhea and peptic ulcers presenting to the ER for evaluation of lower abdominal pain.  Patient states for the past 3 years every time she gets her.  She has very severe pain with body aches, nausea, vomiting and leg numbness.  She states that she has been taking ibuprofen and Tylenol at home without much relief.  She states that this pain is different in the fact that it sharp and constant in nature.  She denies any fever, chills, cough, shortness of breath, chest pain, abnormal vaginal discharge, or any other symptoms.  She denies any concern for STDs.          Review of Systems   Constitutional: Negative for chills and fever.   HENT: Negative.    Eyes: Negative.    Respiratory: Negative.    Cardiovascular: Negative.    Gastrointestinal: Positive for nausea and vomiting. Negative for constipation and diarrhea.   Genitourinary: Positive for pelvic pain. Negative for dysuria, flank pain and hematuria.   Musculoskeletal: Negative.    Skin: Negative.    Allergic/Immunologic: Negative for immunocompromised state.   Neurological: Negative.    Psychiatric/Behavioral: Negative.        Past Medical History:   Diagnosis Date   • Asthma    • Chlamydia    • Gestational hypertension    • Gonorrhea    • Peptic ulceration        Allergies   Allergen Reactions   • Coconut Oil Anaphylaxis       Past Surgical History:   Procedure Laterality Date   •  SECTION  2012   •  SECTION  2014   •  SECTION     •  SECTION WITH TUBAL N/A 2017    Procedure:  SECTION REPEAT WITH TUBAL;  Surgeon: Cinthya Garcia MD;  Location: [facility];  Service:    • NO PAST SURGERIES     • TUBAL ABDOMINAL LIGATION         Family History   Problem Relation Age of Onset   • Hypertension Maternal Grandmother        Social History     Socioeconomic History   • Marital status: Legally      Spouse  "name: Not on file   • Number of children: Not on file   • Years of education: Not on file   • Highest education level: Not on file   Tobacco Use   • Smoking status: Current Every Day Smoker     Packs/day: 0.25     Types: Cigarettes   • Smokeless tobacco: Never Used   Substance and Sexual Activity   • Alcohol use: Yes     Comment: rarely   • Drug use: No   • Sexual activity: Yes     Partners: Male     Birth control/protection: Surgical           Objective   Physical Exam   Nursing note and vitals reviewed.  /79 (BP Location: Right arm, Patient Position: Sitting)   Pulse 92   Temp 97.7 °F (36.5 °C) (Oral)   Resp 18   Ht 152.4 cm (60\")   Wt 79.5 kg (175 lb 3.2 oz)   SpO2 99%   BMI 34.22 kg/m²     GEN: No acute distress, sitting upright in stretcher.  Awake and alert.  Does not appear toxic.  Head: Normocephalic, atraumatic  Eyes: EOM intact  Cardiovascular: Regular rate  Lungs: Clear to auscultation bilaterally  Abdomen: Nondistended.  Bowel sounds present.  Soft, tender to palpation and the suprapubic and bilateral pelvic region with no significant guarding  Extremities: No edema, normal appearance, full ROM. Radial pulses are 2+ and equal  Neuro: GCS 15  Psych: Mood and affect are appropriate    Procedures           ED Course  ED Course as of Feb 16 2032   Sun Feb 16, 2020 1929 WBC(!): 11.02 [LA]   1929 Hemoglobin: 14.0 [LA]   1929 HCG, Urine QL: Negative [LA]   1929 Appearance, UA(!): Cloudy [LA]   1929 pH, UA: 7.0 [LA]   1929 Specific Gravity, UA: >=1.030 [LA]   1929 Glucose: Negative [LA]   1929 Ketones, UA(!): Trace [LA]   1929 Bilirubin, UA: Negative [LA]   1929 Blood, UA(!): Moderate (2+) [LA]   1929 Protein, UA: Negative [LA]   1929 Leukocytes, UA: Negative [LA]   1929 Nitrite, UA: Negative [LA]   1929 RBC, UA(!): 6-12 [LA]   1929 Bacteria, UA(!): 1+ [LA]   1929 Patient currently on menstrual period.   Squamous Epithelial Cells, UA(!): 3-6 [LA]   1937 WBC(!): 11.02 [LA]   1937 Hemoglobin: 14.0 " [LA]   1938 Platelets: 283 [LA]   1938 Neutrophil Rel %(!): 76.4 [LA]   1938 Lymphocyte Rel %(!): 16.5 [LA]   1938 Monocyte Rel %(!): 4.9 [LA]   1938 Eosinophil Rel %: 1.4 [LA]   1938 Glucose: 93 [LA]   1938 Creatinine: 0.80 [LA]   1938 Sodium: 139 [LA]   1938 Potassium: 3.6 [LA]   1938 CO2: 24.2 [LA]   1938 Chloride: 102 [LA]   1938 ALT (SGPT): 8 [LA]   1938 AST (SGOT): 13 [LA]   1938 Alkaline Phosphatase: 106 [LA]   1938 Total Bilirubin: 1.1 [LA]   1938 eGFR  Am: 106 [LA]   1938 Lipase: 24 [LA]   2022 Patient is lying comfortably in the stretcher.  Transvaginal ultrasound is read by the radiologist revealed no acute abnormalities, normal blood flow to both ovaries.  Discussed follow-up with the patient.  We will give her nausea medicine to help.  Discussed return precautions.  She verbalized understanding and was in agreement with this plan of care    [LA]      ED Course User Index  [LA] Bianca Sadler PA-C                                           MDM  Number of Diagnoses or Management Options  Lower abdominal pain:   Painful menstrual periods:   Diagnosis management comments: On Arrival, patient stable, no acute distress, nontoxic appearance.  Differential includes menometrorrhagia, PID, ectopic pregnancy, ovarian torsion.  Lower concern for appendicitis or colitis.  She states this is like her usual menstrual pains except it is more severe and constant.  Will obtain basic labs, UA, UPT and transvaginal ultrasound.  Patient has low concern for STDs, pelvic exam.    Lab work stable.  UA negative.  UPT with blood but patient is currently on her menstrual period.  Transvaginal ultrasound revealed blood flow to both ovaries, no acute abnormalities as read by the radiologist.  Was given Toradol and Zofran here.  Discussed this is likely part of her painful periods and she will need to follow-up with OB/GYN.  Discussed strict return precautions.  She verbalized understanding was in agreement with this plan  of care.       Amount and/or Complexity of Data Reviewed  Clinical lab tests: reviewed and ordered  Review and summarize past medical records: yes  Discuss the patient with other providers: yes    Risk of Complications, Morbidity, and/or Mortality  Presenting problems: moderate  Diagnostic procedures: moderate  Management options: low    Patient Progress  Patient progress: stable      Final diagnoses:   Lower abdominal pain   Painful menstrual periods            Bianca Sadler PA-C  02/16/20 2032

## 2020-03-24 ENCOUNTER — APPOINTMENT (OUTPATIENT)
Dept: GENERAL RADIOLOGY | Facility: HOSPITAL | Age: 25
End: 2020-03-24

## 2020-03-24 ENCOUNTER — HOSPITAL ENCOUNTER (EMERGENCY)
Facility: HOSPITAL | Age: 25
Discharge: HOME | End: 2020-03-24
Attending: EMERGENCY MEDICINE | Admitting: EMERGENCY MEDICINE

## 2020-03-24 VITALS
RESPIRATION RATE: 17 BRPM | HEART RATE: 76 BPM | DIASTOLIC BLOOD PRESSURE: 73 MMHG | BODY MASS INDEX: 33.38 KG/M2 | WEIGHT: 170 LBS | SYSTOLIC BLOOD PRESSURE: 124 MMHG | OXYGEN SATURATION: 98 % | TEMPERATURE: 98.6 F | HEIGHT: 60 IN

## 2020-03-24 DIAGNOSIS — R05.9 COUGH: ICD-10-CM

## 2020-03-24 DIAGNOSIS — J02.9 SORE THROAT: Primary | ICD-10-CM

## 2020-03-24 LAB
B-HCG UR QL: NEGATIVE
BACTERIA UR QL AUTO: ABNORMAL /HPF
BILIRUB UR QL STRIP: NEGATIVE
CLARITY UR: ABNORMAL
COLOR UR: ABNORMAL
FLUAV AG NPH QL: NEGATIVE
FLUBV AG NPH QL IA: NEGATIVE
GLUCOSE UR STRIP-MCNC: NEGATIVE MG/DL
HGB UR QL STRIP.AUTO: NEGATIVE
HYALINE CASTS UR QL AUTO: ABNORMAL /LPF
KETONES UR QL STRIP: NEGATIVE
LEUKOCYTE ESTERASE UR QL STRIP.AUTO: ABNORMAL
NITRITE UR QL STRIP: NEGATIVE
PH UR STRIP.AUTO: 7.5 [PH] (ref 5–8)
PROT UR QL STRIP: NEGATIVE
RBC # UR: ABNORMAL /HPF
REF LAB TEST METHOD: ABNORMAL
S PYO AG THROAT QL: NEGATIVE
SP GR UR STRIP: 1.02 (ref 1–1.03)
SQUAMOUS #/AREA URNS HPF: ABNORMAL /HPF
UNIDENT CRYS URNS QL MICRO: ABNORMAL /HPF
UROBILINOGEN UR QL STRIP: ABNORMAL
WBC UR QL AUTO: ABNORMAL /HPF

## 2020-03-24 PROCEDURE — 87804 INFLUENZA ASSAY W/OPTIC: CPT | Performed by: PHYSICIAN ASSISTANT

## 2020-03-24 PROCEDURE — 71046 X-RAY EXAM CHEST 2 VIEWS: CPT

## 2020-03-24 PROCEDURE — 81025 URINE PREGNANCY TEST: CPT | Performed by: PHYSICIAN ASSISTANT

## 2020-03-24 PROCEDURE — 81001 URINALYSIS AUTO W/SCOPE: CPT | Performed by: PHYSICIAN ASSISTANT

## 2020-03-24 PROCEDURE — 99283 EMERGENCY DEPT VISIT LOW MDM: CPT

## 2020-03-24 PROCEDURE — 87880 STREP A ASSAY W/OPTIC: CPT | Performed by: PHYSICIAN ASSISTANT

## 2020-03-24 PROCEDURE — 87081 CULTURE SCREEN ONLY: CPT | Performed by: PHYSICIAN ASSISTANT

## 2020-03-24 RX ORDER — ALBUTEROL SULFATE 90 UG/1
2 AEROSOL, METERED RESPIRATORY (INHALATION) EVERY 4 HOURS PRN
Qty: 6.7 G | Refills: 0 | OUTPATIENT
Start: 2020-03-24 | End: 2021-03-11

## 2020-03-26 LAB — BACTERIA SPEC AEROBE CULT: NORMAL

## 2020-12-02 ENCOUNTER — OFFICE VISIT (OUTPATIENT)
Dept: OBSTETRICS AND GYNECOLOGY | Facility: CLINIC | Age: 25
End: 2020-12-02

## 2020-12-02 VITALS
SYSTOLIC BLOOD PRESSURE: 126 MMHG | HEIGHT: 65 IN | WEIGHT: 201 LBS | BODY MASS INDEX: 33.49 KG/M2 | DIASTOLIC BLOOD PRESSURE: 66 MMHG

## 2020-12-02 DIAGNOSIS — N94.6 DYSMENORRHEA: ICD-10-CM

## 2020-12-02 DIAGNOSIS — N91.2 AMENORRHEA: Primary | ICD-10-CM

## 2020-12-02 DIAGNOSIS — Z80.49 FAMILY HISTORY OF CERVICAL CANCER: ICD-10-CM

## 2020-12-02 DIAGNOSIS — N95.1 MENOPAUSAL SYMPTOMS: ICD-10-CM

## 2020-12-02 DIAGNOSIS — Z86.19 HISTORY OF SEXUALLY TRANSMITTED DISEASE: ICD-10-CM

## 2020-12-02 PROCEDURE — 99214 OFFICE O/P EST MOD 30 MIN: CPT | Performed by: OBSTETRICS & GYNECOLOGY

## 2020-12-02 NOTE — PROGRESS NOTES
Subjective  Chief Complaint   Patient presents with   • Amenorrhea     Patient advised hasn't had menstrual cycle since September.      Patient is 25 y.o.  here for evaluation of her irregular menstrual cycles.  Patient reports she has not had a menses since September of this year.  Her last cycle was on .  Patient had previously been seen last year with complaints of menorrhagia and irregular menses.  Patient also had dysmenorrhea at that time.  Patient was also considering a tubal reversal.  She was referred to reproductive endocrinology.  The patient is now no longer desiring a tubal reversal.  She is desiring a hysterectomy secondary to her symptoms.  Of note upon review of her medical records the patient was seen at [facility] on  of last year.  Her significant other had been unfaithful.  Patient had been exposed to gonorrhea.  Her test did return positive.  Patient was treated.  She also had other labs for STIs.  Patient reports her menstrual cycles continued to remain heavy and irregular until September.  Patient reports now she has had no bleeding since that time.  She denies any changes in her health or medications.  She reports she has been under significant stress.  Patient also has had the onset of hot flashes as well as night sweats.  She denies any family history of early menopause.  Patient denies any headaches or visual changes.  She denies any discharge or leaking from her breast.  Patient is not on any supplements.  The patient is also concerned as her aunt has recently been diagnosed with advanced cervical cancer.  Patient had her last Pap smear in 2017.  Her Pap smear was normal at that time.    History  Past Medical History:   Diagnosis Date   • Asthma    • Chlamydia    • Gestational hypertension    • Gonorrhea    • Peptic ulceration      Current Outpatient Medications on File Prior to Visit   Medication Sig Dispense Refill   • albuterol sulfate  (90  "Base) MCG/ACT inhaler Inhale 2 puffs Every 4 (Four) Hours As Needed for Wheezing or Shortness of Air. 6.7 g 0     No current facility-administered medications on file prior to visit.      Allergies   Allergen Reactions   • Coconut Oil Anaphylaxis     Past Surgical History:   Procedure Laterality Date   •  SECTION  2012   •  SECTION  2014   •  SECTION     •  SECTION WITH TUBAL N/A 2017    Procedure:  SECTION REPEAT WITH TUBAL;  Surgeon: Cinthya Garcia MD;  Location: [facility];  Service:    • NO PAST SURGERIES     • TUBAL ABDOMINAL LIGATION       Family History   Problem Relation Age of Onset   • Hypertension Maternal Grandmother      Social History     Socioeconomic History   • Marital status:      Spouse name: Not on file   • Number of children: Not on file   • Years of education: Not on file   • Highest education level: Not on file   Tobacco Use   • Smoking status: Current Every Day Smoker     Packs/day: 0.25     Types: Cigarettes   • Smokeless tobacco: Never Used   Substance and Sexual Activity   • Alcohol use: Yes     Comment: rarely   • Drug use: No   • Sexual activity: Yes     Partners: Male     Birth control/protection: Surgical       Review of Systems  The following systems were reviewed and negative:  constitution, eyes, ENT, respiratory, cardiovascular, gastrointestinal, genitourinary, integument, breast, hematologic / lymphatic, musculoskeletal, neurological, behavioral/psych, endocrine and allergies / immunologic  Objective  Vitals:    20 1516   BP: 126/66   Weight: 91.2 kg (201 lb)   Height: 165.1 cm (65\")     Physical Exam:  General Appearance: alert, appears stated age and cooperative  Head: normocephalic, without obvious abnormality and atraumatic  Eyes: lids and lashes normal, conjunctivae and sclerae normal, no icterus, no pallor, corneas clear and PERRLA  Ears: ears appear intact with no abnormalities noted  Nose: nares normal, " septum midline, mucosa normal and no drainage  Neck: suppple, trachea midline and no thyromegaly  Lungs: clear to auscultation, respirations regular, respirations even and respirations unlabored  Heart: regular rhythm and normal rate, normal S1, S2, no murmur, gallop, or rubs and no click  Breasts: Not performed.  Abdomen: normal bowel sounds, no masses, no hepatomegaly, no splenomegaly, soft non-tender, no guarding and no rebound tenderness  Pelvic: Not performed.  Extremities: moves extremities well, no edema, no cyanosis and no redness  Skin: no bleeding, bruising or rash and no lesions noted  Lymph Nodes: no palpable adenopathy  Neuro: CN II-X grossly intact; sensation intact  Psych: normal mood and affect, oriented to person, time and place, thought content organized and appropriate judgment  Lab Review   Labs as noted    Imaging   Pelvic ultrasound report  Pelvic ultrasound images independantly reviewed; see report as noted  US Non-ob Transvaginal  Bessie Padilla  : 1995  MRN: [id number]  Date: 2020    Reason for exam/History: Amenorrhea    The ultrasound images are reviewed.  The uterus is anteverted in position.    The uterus appears normal.  The endometrium measures 9.8 mms.  The   bilateral ovaries are normal in appearance.  There are multiple follicles   seen bilaterally.  There is normal vascular flow noted.  There is no free   fluid noted.    The exam limitations noted:  none    See ultrasound report for measurements and structures identified.    Cinthya Garcia MD, RDMS  [facility]  OB GYN [place]    Decision to Obtain Medical Records  No    Summary of Medical Records  Yes as noted above    Assessment/Plan  Problems Addressed this Visit     None      Visit Diagnoses     Amenorrhea    -  Primary New  Patient now with new onset amenorrhea.  Will obtain labs today as noted.  Patient will probably need a challenge with Provera as discussed.  Patient is to follow-up post her  menstrual cycle for her annual examination.  Instructions and precautions have been given.    Relevant Orders    US Non-ob Transvaginal (Completed)    HCG, B-subunit, Quantitative    CBC & Differential    Testosterone, Free, Total    Follicle Stimulating Hormone    Estradiol    Thyroid Panel With TSH    Prolactin    Comprehensive Metabolic Panel    Dysmenorrhea    Unchanged  Patient reports having continued severe menstrual cramps with her menses.  Patient is certain she no longer desires childbearing.  She has had a tubal ligation.  Patient is desiring definitive treatment with hysterectomy.  Patient is to follow-up as discussed.    Menopausal symptoms    New  Patient with new onset menopausal symptoms as noted.  Will obtain hormone levels today.  Plan pending results.    History of sexually transmitted disease      Patient with history of sexually transmitted diseases.  Patient is to return for her Pap smear as well as cultures.  Plan pending results.    Family history of cervical cancer    New  Patient with family history of cervical cancer.  I discussed with the patient the majority of cervical cancer is not inherited.  Patient is to follow-up for her annual examination as noted.      Diagnoses       Codes Comments    Amenorrhea    -  Primary ICD-10-CM: N91.2  ICD-9-CM: 626.0     Dysmenorrhea     ICD-10-CM: N94.6  ICD-9-CM: 625.3     Menopausal symptoms     ICD-10-CM: N95.1  ICD-9-CM: 627.2     History of sexually transmitted disease     ICD-10-CM: Z86.19  ICD-9-CM: V12.09     Family history of cervical cancer     ICD-10-CM: Z80.49  ICD-9-CM: V16.49                Follow up as discussed/scheduled  Note: Speech recognition transcription software may have been used to dictate portions of this document.  An attempt at proofreading has been made though minor errors in transcription may still be present.  This note was electronically signed.  Cinthya Garcia M.D.

## 2020-12-03 LAB
BASOPHILS # BLD AUTO: 0.05 10*3/MM3 (ref 0–0.2)
BASOPHILS NFR BLD AUTO: 0.6 % (ref 0–1.5)
EOSINOPHIL # BLD AUTO: 0.1 10*3/MM3 (ref 0–0.4)
EOSINOPHIL NFR BLD AUTO: 1.2 % (ref 0.3–6.2)
ERYTHROCYTE [DISTWIDTH] IN BLOOD BY AUTOMATED COUNT: 12.6 % (ref 12.3–15.4)
ESTRADIOL SERPL-MCNC: 53.9 PG/ML
FSH SERPL-ACNC: 8.5 MIU/ML
FT4I SERPL CALC-MCNC: 2.7 (ref 1.2–4.9)
HCG INTACT+B SERPL-ACNC: 0.75 MIU/ML
HCT VFR BLD AUTO: 39.8 % (ref 34–46.6)
HGB BLD-MCNC: 13.6 G/DL (ref 12–15.9)
IMM GRANULOCYTES # BLD AUTO: 0.01 10*3/MM3 (ref 0–0.05)
IMM GRANULOCYTES NFR BLD AUTO: 0.1 % (ref 0–0.5)
LYMPHOCYTES # BLD AUTO: 3.01 10*3/MM3 (ref 0.7–3.1)
LYMPHOCYTES NFR BLD AUTO: 36.5 % (ref 19.6–45.3)
MCH RBC QN AUTO: 31 PG (ref 26.6–33)
MCHC RBC AUTO-ENTMCNC: 34.2 G/DL (ref 31.5–35.7)
MCV RBC AUTO: 90.7 FL (ref 79–97)
MONOCYTES # BLD AUTO: 0.49 10*3/MM3 (ref 0.1–0.9)
MONOCYTES NFR BLD AUTO: 5.9 % (ref 5–12)
NEUTROPHILS # BLD AUTO: 4.58 10*3/MM3 (ref 1.7–7)
NEUTROPHILS NFR BLD AUTO: 55.7 % (ref 42.7–76)
NRBC BLD AUTO-RTO: 0 /100 WBC (ref 0–0.2)
PLATELET # BLD AUTO: 275 10*3/MM3 (ref 140–450)
PROLACTIN SERPL-MCNC: 7.4 NG/ML (ref 4.8–23.3)
RBC # BLD AUTO: 4.39 10*6/MM3 (ref 3.77–5.28)
T3RU NFR SERPL: 30 % (ref 24–39)
T4 SERPL-MCNC: 9.1 UG/DL (ref 4.5–12)
TESTOST FREE SERPL-MCNC: 2.4 PG/ML (ref 0–4.2)
TESTOST SERPL-MCNC: 36 NG/DL (ref 8–48)
TSH SERPL DL<=0.005 MIU/L-ACNC: 1.45 UIU/ML (ref 0.45–4.5)
WBC # BLD AUTO: 8.24 10*3/MM3 (ref 3.4–10.8)

## 2020-12-07 ENCOUNTER — RESULTS ENCOUNTER (OUTPATIENT)
Dept: OBSTETRICS AND GYNECOLOGY | Facility: CLINIC | Age: 25
End: 2020-12-07

## 2020-12-07 DIAGNOSIS — N91.2 AMENORRHEA: ICD-10-CM

## 2020-12-07 RX ORDER — MEDROXYPROGESTERONE ACETATE 10 MG/1
10 TABLET ORAL DAILY
Qty: 10 TABLET | Refills: 0 | Status: SHIPPED | OUTPATIENT
Start: 2020-12-07 | End: 2021-02-10

## 2020-12-15 ENCOUNTER — OFFICE VISIT (OUTPATIENT)
Dept: OBSTETRICS AND GYNECOLOGY | Facility: CLINIC | Age: 25
End: 2020-12-15

## 2020-12-15 VITALS
SYSTOLIC BLOOD PRESSURE: 112 MMHG | WEIGHT: 200 LBS | BODY MASS INDEX: 33.32 KG/M2 | DIASTOLIC BLOOD PRESSURE: 70 MMHG | HEIGHT: 65 IN

## 2020-12-15 DIAGNOSIS — Z01.419 ENCOUNTER FOR GYNECOLOGICAL EXAMINATION WITHOUT ABNORMAL FINDING: Primary | ICD-10-CM

## 2020-12-15 PROCEDURE — 99395 PREV VISIT EST AGE 18-39: CPT | Performed by: OBSTETRICS & GYNECOLOGY

## 2020-12-15 NOTE — PROGRESS NOTES
Subjective   Chief Complaint   Patient presents with   • Gynecologic Exam     last pap 5 years ago, seen  2 weeks ago for Amenorrhea, hasnt had mesnes since Sept, pt is to start Provera today     Bessie Padilla is a 25 y.o. year old  C/S x3, BTL presenting to be seen for her annual exam. Amenorrhea for 3+ months- prior to this menses regular but very painful     SEXUAL Hx:  She is currently sexually active.  In the past year there has not been new sexual partners.    Condoms are not typically used.  She would not like to be screened for STD's at today's exam.  Current birth control method: not using any form of contraception and does not wish to get pregnant.  MENSTRUAL Hx:  Patient's last menstrual period was 2020.  In the past 6 months her cycles have been absent.   Her menstrual flow has been absent.   Each month on average there are roughly 0 days of very heavy flow.    Intermenstrual bleeding is absent.    Post-coital bleeding is absent.  Dysmenorrhea: is not affecting her activities of daily living  PMS: is not affecting her activities of daily living  Her cycles are not a source of concern for her that she wishes to discuss today.  HEALTH Hx:  She exercises regularly: no (and has no plans to become more active).  She wears her seat belt:yes.  She has concerns about domestic violence: no.  OTHER COMPLAINTS:  Nothing else    The following portions of the patient's history were reviewed and updated as appropriate:  She  has a past medical history of Asthma, Chlamydia, Gestational hypertension, Gonorrhea, and Peptic ulceration.  She does not have any pertinent problems on file.  She  has a past surgical history that includes No past surgeries;  section with tubal (N/A, 2017); Tubal ligation;  section (2012);  section (2014); and  section ().  Her family history includes Hypertension in her maternal grandmother.  She  reports that she has  "been smoking cigarettes. She has been smoking about 0.25 packs per day. She has never used smokeless tobacco. She reports current alcohol use. She reports that she does not use drugs.  Current Outpatient Medications   Medication Sig Dispense Refill   • albuterol sulfate  (90 Base) MCG/ACT inhaler Inhale 2 puffs Every 4 (Four) Hours As Needed for Wheezing or Shortness of Air. 6.7 g 0   • medroxyPROGESTERone (Provera) 10 MG tablet Take 1 tablet by mouth Daily. 10 tablet 0     No current facility-administered medications for this visit.      Current Outpatient Medications on File Prior to Visit   Medication Sig   • albuterol sulfate  (90 Base) MCG/ACT inhaler Inhale 2 puffs Every 4 (Four) Hours As Needed for Wheezing or Shortness of Air.   • medroxyPROGESTERone (Provera) 10 MG tablet Take 1 tablet by mouth Daily.     No current facility-administered medications on file prior to visit.      She is allergic to coconut oil..    Social History    Tobacco Use      Smoking status: Current Every Day Smoker        Packs/day: 0.25        Types: Cigarettes      Smokeless tobacco: Never Used    Review of Systems  Consitutional NEG: anorexia or night sweats    POS: nothing reported   Gastointestinal NEG: bloating, change in bowel habits, melena or reflux symptoms    POS: nothing reported   Genitourinary NEG: dysuria or hematuria    POS: nothing reported   Integument NEG: moles that are changing in size, shape, color or rashes    POS: nothing reported   Breast NEG: persistent breast lump, skin dimpling or nipple discharge    POS: nothing reported          Objective   /70   Ht 165.1 cm (65\")   Wt 90.7 kg (200 lb)   LMP 09/11/2020   BMI 33.28 kg/m²     General:  well developed; well nourished  no acute distress   Skin:  No suspicious lesions seen   Thyroid: normal to inspection and palpation   Breasts:  Examined in supine position  Symmetric without masses or skin dimpling  Nipples normal without inversion, " lesions or discharge  There are no palpable axillary nodes   Abdomen: soft, non-tender; no masses  no umbilical or inguinal hernias are present  no hepato-splenomegaly   Pelvis: Clinical staff was present for exam  External genitalia:  normal appearance of the external genitalia including Bartholin's and Olar's glands.  :  urethral meatus normal;  Vaginal:  normal pink mucosa without prolapse or lesions.  Cervix:  normal appearance.  Uterus:  normal size, shape and consistency.  Adnexa:  normal bimanual exam of the adnexa.  Rectal:  digital rectal exam not performed; anus visually normal appearing.        Assessment   1. PE WNL  2. H/O amenorrhea-- labs reviewed and noted to be normal-- starting provera today     Plan   1. PAP done  2. Diet/exercise  3. Follow up prn- Dr Garcia    No orders of the defined types were placed in this encounter.         This note was electronically signed.      December 15, 2020

## 2020-12-22 DIAGNOSIS — Z01.419 ENCOUNTER FOR GYNECOLOGICAL EXAMINATION WITHOUT ABNORMAL FINDING: ICD-10-CM

## 2021-02-10 ENCOUNTER — OFFICE VISIT (OUTPATIENT)
Dept: OBSTETRICS AND GYNECOLOGY | Facility: CLINIC | Age: 26
End: 2021-02-10

## 2021-02-10 VITALS
BODY MASS INDEX: 33.82 KG/M2 | HEIGHT: 65 IN | WEIGHT: 203 LBS | DIASTOLIC BLOOD PRESSURE: 64 MMHG | SYSTOLIC BLOOD PRESSURE: 122 MMHG

## 2021-02-10 DIAGNOSIS — N92.1 MENORRHAGIA WITH IRREGULAR CYCLE: Primary | ICD-10-CM

## 2021-02-10 DIAGNOSIS — Z86.19 HISTORY OF SEXUALLY TRANSMITTED DISEASE: ICD-10-CM

## 2021-02-10 DIAGNOSIS — N94.6 DYSMENORRHEA: ICD-10-CM

## 2021-02-10 DIAGNOSIS — Z80.49 FAMILY HISTORY OF CERVICAL CANCER: ICD-10-CM

## 2021-02-10 PROCEDURE — 99214 OFFICE O/P EST MOD 30 MIN: CPT | Performed by: OBSTETRICS & GYNECOLOGY

## 2021-02-10 RX ORDER — MEDROXYPROGESTERONE ACETATE 10 MG/1
10 TABLET ORAL DAILY
Qty: 30 TABLET | Refills: 1 | OUTPATIENT
Start: 2021-02-10 | End: 2021-03-11

## 2021-02-10 NOTE — PROGRESS NOTES
"Chief Complaint  Follow-up (Follow up provera, patient advised still having heavy/painful menstrual cycle. )     History of Present Illness:  Patient is 25 y.o.  who presents to [facility] for follow-up evaluation of her menorrhagia and dysmenorrhea.  Patient has a history of menorrhagia and dysmenorrhea.  Patient had been seen in December however with an episode of amenorrhea.  Patient had not had a menstrual cycle since September.  Patient had labs obtained which are reviewed with the patient.  Patient was given Provera.  Patient reports taking the Provera starting on December 15.  She took the Provera for 10 days.  Patient did have a menstrual cycle on  through .  Patient reports that menstrual cycle was not heavy or painful.  Patient reports having another menses on .  This menstrual cycle was on her own.  Patient reports it was extremely heavy as well as painful.  Patient was changing a pad every 45 minutes.  Patient did return and have her Pap smear as well as cultures obtained.  Those have been reviewed today as well.    Physical Examination:  Vital Signs: /64   Ht 165.1 cm (65\")   Wt 92.1 kg (203 lb)   BMI 33.78 kg/m²     General Appearance: alert, appears stated age, and cooperative  Breasts: Not performed.  Abdomen: no masses, no hepatomegaly, no splenomegaly, soft non-tender, no guarding and no rebound tenderness  Pelvic: Not performed.    Data Review:  The following data was reviewed by: Cinthya Garcia MD on 02/10/2021:     Labs:    Pap IG, Rfx HPV ASCU (12/15/2020)  Prolactin (2020 15:42)  Thyroid Panel With TSH (2020 15:42)  Estradiol (2020 15:42)  Follicle Stimulating Hormone (2020 15:42)  Testosterone, Free, Total (2020 15:42)  CBC & Differential (2020 15:42)  HCG, B-subunit, Quantitative (2020 15:42)    Imaging:    Medical Records:  None    Assessment and Plan "   Problem List Items Addressed This Visit     None      Visit Diagnoses     Menorrhagia with irregular cycle    -  Primary  I had a long discussion with the patient regarding the various treatment options.  Given the patient did well with Provera will plan cyclic Provera as noted.  Instructions and precautions are given.  Patient is to take starting the 20th of each month.  Patient is to take for 10 days.  Patient is to follow-up in 3 months with transvaginal ultrasound.    Dysmenorrhea      I discussed with the patient the various treatment options.  We will plan cyclic Provera as noted.  Prescription is given as noted.  Instructions and precautions are given.  Patient is to follow-up in 3 months with transvaginal ultrasound following her menstrual cycle.    History of sexually transmitted disease      Patient did return and had cultures with Dr. Andino.  Those are reviewed today.  Those were negative as noted.    Family history of cervical cancer      Patient did have recent Pap smear on December 15.  Her Pap smear did return normal.  Patient has been informed regarding those findings.          Follow Up/Instructions:    Patient was given instructions and counseling regarding her condition or for health maintenance advice. Please see specific information pulled into the AVS if appropriate.     Note: Speech recognition transcription software may have been used to dictate portions of this document.  An attempt at proofreading has been made though minor errors in transcription may still be present.    This note was electronically signed.  Cinthya Garcia M.D.

## 2021-02-19 ENCOUNTER — HOSPITAL ENCOUNTER (EMERGENCY)
Facility: HOSPITAL | Age: 26
Discharge: HOME | End: 2021-02-19
Attending: EMERGENCY MEDICINE | Admitting: EMERGENCY MEDICINE

## 2021-02-19 ENCOUNTER — APPOINTMENT (OUTPATIENT)
Dept: CT IMAGING | Facility: HOSPITAL | Age: 26
End: 2021-02-19

## 2021-02-19 VITALS
WEIGHT: 206 LBS | HEART RATE: 99 BPM | DIASTOLIC BLOOD PRESSURE: 89 MMHG | HEIGHT: 60 IN | SYSTOLIC BLOOD PRESSURE: 143 MMHG | RESPIRATION RATE: 18 BRPM | BODY MASS INDEX: 40.44 KG/M2 | TEMPERATURE: 98.9 F | OXYGEN SATURATION: 97 %

## 2021-02-19 DIAGNOSIS — N76.0 BV (BACTERIAL VAGINOSIS): Primary | ICD-10-CM

## 2021-02-19 DIAGNOSIS — B96.89 BV (BACTERIAL VAGINOSIS): Primary | ICD-10-CM

## 2021-02-19 LAB
ALBUMIN SERPL-MCNC: 4.2 G/DL (ref 3.5–5.2)
ALBUMIN/GLOB SERPL: 1.8 G/DL
ALP SERPL-CCNC: 90 U/L (ref 39–117)
ALT SERPL W P-5'-P-CCNC: 10 U/L (ref 1–33)
ANION GAP SERPL CALCULATED.3IONS-SCNC: 6.8 MMOL/L (ref 5–15)
AST SERPL-CCNC: 16 U/L (ref 1–32)
B-HCG UR QL: NEGATIVE
BASOPHILS # BLD AUTO: 0.03 10*3/MM3 (ref 0–0.2)
BASOPHILS NFR BLD AUTO: 0.5 % (ref 0–1.5)
BILIRUB SERPL-MCNC: 0.7 MG/DL (ref 0–1.2)
BILIRUB UR QL STRIP: NEGATIVE
BUN SERPL-MCNC: 6 MG/DL (ref 6–20)
BUN/CREAT SERPL: 7.6 (ref 7–25)
CALCIUM SPEC-SCNC: 9.3 MG/DL (ref 8.6–10.5)
CHLORIDE SERPL-SCNC: 105 MMOL/L (ref 98–107)
CLARITY UR: CLEAR
CLUE CELLS SPEC QL WET PREP: ABNORMAL
CO2 SERPL-SCNC: 27.2 MMOL/L (ref 22–29)
COLOR UR: YELLOW
CREAT SERPL-MCNC: 0.79 MG/DL (ref 0.57–1)
DEPRECATED RDW RBC AUTO: 40.4 FL (ref 37–54)
EOSINOPHIL # BLD AUTO: 0.05 10*3/MM3 (ref 0–0.4)
EOSINOPHIL NFR BLD AUTO: 0.8 % (ref 0.3–6.2)
ERYTHROCYTE [DISTWIDTH] IN BLOOD BY AUTOMATED COUNT: 12.2 % (ref 12.3–15.4)
GFR SERPL CREATININE-BSD FRML MDRD: 107 ML/MIN/1.73
GLOBULIN UR ELPH-MCNC: 2.4 GM/DL
GLUCOSE SERPL-MCNC: 84 MG/DL (ref 65–99)
GLUCOSE UR STRIP-MCNC: NEGATIVE MG/DL
HCT VFR BLD AUTO: 39.6 % (ref 34–46.6)
HGB BLD-MCNC: 13.3 G/DL (ref 12–15.9)
HGB UR QL STRIP.AUTO: NEGATIVE
HYDATID CYST SPEC WET PREP: ABNORMAL
IMM GRANULOCYTES # BLD AUTO: 0.01 10*3/MM3 (ref 0–0.05)
IMM GRANULOCYTES NFR BLD AUTO: 0.2 % (ref 0–0.5)
KETONES UR QL STRIP: NEGATIVE
KOH PREP NAIL: NORMAL
LEUKOCYTE ESTERASE UR QL STRIP.AUTO: NEGATIVE
LYMPHOCYTES # BLD AUTO: 2.75 10*3/MM3 (ref 0.7–3.1)
LYMPHOCYTES NFR BLD AUTO: 45 % (ref 19.6–45.3)
MCH RBC QN AUTO: 30.7 PG (ref 26.6–33)
MCHC RBC AUTO-ENTMCNC: 33.6 G/DL (ref 31.5–35.7)
MCV RBC AUTO: 91.5 FL (ref 79–97)
MONOCYTES # BLD AUTO: 0.41 10*3/MM3 (ref 0.1–0.9)
MONOCYTES NFR BLD AUTO: 6.7 % (ref 5–12)
NEUTROPHILS NFR BLD AUTO: 2.86 10*3/MM3 (ref 1.7–7)
NEUTROPHILS NFR BLD AUTO: 46.8 % (ref 42.7–76)
NITRITE UR QL STRIP: NEGATIVE
NRBC BLD AUTO-RTO: 0 /100 WBC (ref 0–0.2)
PH UR STRIP.AUTO: 6 [PH] (ref 5–8)
PLATELET # BLD AUTO: 229 10*3/MM3 (ref 140–450)
PMV BLD AUTO: 9.3 FL (ref 6–12)
POTASSIUM SERPL-SCNC: 3.7 MMOL/L (ref 3.5–5.2)
PROT SERPL-MCNC: 6.6 G/DL (ref 6–8.5)
PROT UR QL STRIP: NEGATIVE
RBC # BLD AUTO: 4.33 10*6/MM3 (ref 3.77–5.28)
SODIUM SERPL-SCNC: 139 MMOL/L (ref 136–145)
SP GR UR STRIP: 1.01 (ref 1–1.03)
T VAGINALIS SPEC QL WET PREP: ABNORMAL
UROBILINOGEN UR QL STRIP: NORMAL
WBC # BLD AUTO: 6.11 10*3/MM3 (ref 3.4–10.8)
WBC SPEC QL WET PREP: ABNORMAL
YEAST GENITAL QL WET PREP: ABNORMAL

## 2021-02-19 PROCEDURE — 87220 TISSUE EXAM FOR FUNGI: CPT | Performed by: NURSE PRACTITIONER

## 2021-02-19 PROCEDURE — 80053 COMPREHEN METABOLIC PANEL: CPT | Performed by: NURSE PRACTITIONER

## 2021-02-19 PROCEDURE — 87491 CHLMYD TRACH DNA AMP PROBE: CPT | Performed by: NURSE PRACTITIONER

## 2021-02-19 PROCEDURE — 85025 COMPLETE CBC W/AUTO DIFF WBC: CPT | Performed by: NURSE PRACTITIONER

## 2021-02-19 PROCEDURE — 25010000002 ONDANSETRON PER 1 MG: Performed by: NURSE PRACTITIONER

## 2021-02-19 PROCEDURE — 87591 N.GONORRHOEAE DNA AMP PROB: CPT | Performed by: NURSE PRACTITIONER

## 2021-02-19 PROCEDURE — 81025 URINE PREGNANCY TEST: CPT | Performed by: NURSE PRACTITIONER

## 2021-02-19 PROCEDURE — 87210 SMEAR WET MOUNT SALINE/INK: CPT | Performed by: NURSE PRACTITIONER

## 2021-02-19 PROCEDURE — 96375 TX/PRO/DX INJ NEW DRUG ADDON: CPT

## 2021-02-19 PROCEDURE — 74176 CT ABD & PELVIS W/O CONTRAST: CPT

## 2021-02-19 PROCEDURE — 99283 EMERGENCY DEPT VISIT LOW MDM: CPT

## 2021-02-19 PROCEDURE — 81003 URINALYSIS AUTO W/O SCOPE: CPT | Performed by: EMERGENCY MEDICINE

## 2021-02-19 PROCEDURE — 25010000002 KETOROLAC TROMETHAMINE PER 15 MG: Performed by: NURSE PRACTITIONER

## 2021-02-19 PROCEDURE — 96374 THER/PROPH/DIAG INJ IV PUSH: CPT

## 2021-02-19 RX ORDER — METRONIDAZOLE 500 MG/1
500 TABLET ORAL 2 TIMES DAILY
Qty: 14 TABLET | Refills: 0 | OUTPATIENT
Start: 2021-02-19 | End: 2021-03-11

## 2021-02-19 RX ORDER — SODIUM CHLORIDE 0.9 % (FLUSH) 0.9 %
10 SYRINGE (ML) INJECTION AS NEEDED
Status: DISCONTINUED | OUTPATIENT
Start: 2021-02-19 | End: 2021-02-19 | Stop reason: HOSPADM

## 2021-02-19 RX ORDER — ONDANSETRON 2 MG/ML
4 INJECTION INTRAMUSCULAR; INTRAVENOUS ONCE
Status: COMPLETED | OUTPATIENT
Start: 2021-02-19 | End: 2021-02-19

## 2021-02-19 RX ORDER — KETOROLAC TROMETHAMINE 30 MG/ML
30 INJECTION, SOLUTION INTRAMUSCULAR; INTRAVENOUS ONCE
Status: COMPLETED | OUTPATIENT
Start: 2021-02-19 | End: 2021-02-19

## 2021-02-19 RX ADMIN — ONDANSETRON 4 MG: 2 INJECTION INTRAMUSCULAR; INTRAVENOUS at 20:01

## 2021-02-19 RX ADMIN — SODIUM CHLORIDE 1000 ML: 9 INJECTION, SOLUTION INTRAVENOUS at 20:02

## 2021-02-19 RX ADMIN — KETOROLAC TROMETHAMINE 30 MG: 30 INJECTION, SOLUTION INTRAMUSCULAR at 20:01

## 2021-02-20 NOTE — ED PROVIDER NOTES
Subjective   History of Present Illness  This is a 25 year old female who comes in today complaining of dysuria x 2 days. She went to her PCP today to see if she had a UTI but they said it was negative. She said she went home and has had trouble urinating since. She also reports now having left flank pain. She denies fever, chills or vomiting. She also has a new sex partner and was also worried she may have a STI. She denies any vaginal discharge.    Review of Systems   Constitutional: Negative.    HENT: Negative.    Eyes: Negative.    Respiratory: Negative.    Cardiovascular: Negative.    Gastrointestinal: Negative.    Endocrine: Negative.    Genitourinary: Positive for dysuria and flank pain.   Skin: Negative.    Allergic/Immunologic: Negative.    Neurological: Negative.    Psychiatric/Behavioral: Negative.        Past Medical History:   Diagnosis Date   • Asthma    • Chlamydia    • Gestational hypertension    • Gonorrhea    • Peptic ulceration        Allergies   Allergen Reactions   • Coconut Oil Anaphylaxis       Past Surgical History:   Procedure Laterality Date   •  SECTION  2012   •  SECTION  2014   •  SECTION     •  SECTION WITH TUBAL N/A 2017    Procedure:  SECTION REPEAT WITH TUBAL;  Surgeon: Cinthya Garcia MD;  Location: [facility];  Service:    • NO PAST SURGERIES     • TUBAL ABDOMINAL LIGATION         Family History   Problem Relation Age of Onset   • Hypertension Maternal Grandmother        Social History     Socioeconomic History   • Marital status:      Spouse name: Not on file   • Number of children: Not on file   • Years of education: Not on file   • Highest education level: Not on file   Tobacco Use   • Smoking status: Current Every Day Smoker     Packs/day: 0.25     Types: Cigarettes   • Smokeless tobacco: Never Used   Substance and Sexual Activity   • Alcohol use: Yes     Comment: rarely   • Drug use: No   • Sexual activity: Yes      Partners: Male     Birth control/protection: Surgical           Objective   Physical Exam  Vitals signs and nursing note reviewed.   Constitutional:       Appearance: Normal appearance. She is normal weight.   Neurological:      Mental Status: She is alert.       GEN: No acute distress  Head: Normocephalic, atraumatic  Eyes: Pupils equal round reactive to light  ENT: Posterior pharynx normal in appearance, oral mucosa is moist  Chest: Nontender to palpation  Cardiovascular: Regular rate  Lungs: Clear to auscultation bilaterally  Abdomen: Soft, nontender, nondistended, no peritoneal signs  Extremities: No edema, normal appearance  Neuro: GCS 15  Psych: Mood and affect are appropriate    Procedures           ED Course  ED Course as of Feb 19 2128 Fri Feb 19, 2021 2119 I have discussed the findings with the patient. I have advised her to follow up with her PCP. I have given her strict return to care instructions and she is agreeable to this plan of care.     [TW]   2123 I have discussed the findings with the patient. I have advised her to follow up with her pcp in the next 24-48 hours. I have given her strict return to care instructions and she is agreeable to this plan of care.     [TW]      ED Course User Index  [TW] Lucila José, RADHA                                           MDM  Number of Diagnoses or Management Options  Diagnosis management comments: Differential dx: UTI, STI, kidney stone.        Amount and/or Complexity of Data Reviewed  Clinical lab tests: reviewed and ordered  Tests in the radiology section of CPT®: ordered and reviewed  Review and summarize past medical records: yes  Discuss the patient with other providers: yes    Risk of Complications, Morbidity, and/or Mortality  Presenting problems: moderate  Diagnostic procedures: moderate  Management options: moderate        Final diagnoses:   BV (bacterial vaginosis)            Lucila José APRN  02/19/21 2128

## 2021-02-22 LAB
C TRACH RRNA SPEC QL NAA+PROBE: POSITIVE
N GONORRHOEA RRNA SPEC QL NAA+PROBE: NEGATIVE

## 2021-02-23 ENCOUNTER — TELEPHONE (OUTPATIENT)
Dept: EMERGENCY DEPT | Facility: HOSPITAL | Age: 26
End: 2021-02-23

## 2021-02-23 RX ORDER — AZITHROMYCIN 250 MG/1
TABLET, FILM COATED ORAL
Qty: 4 TABLET | Refills: 0 | OUTPATIENT
Start: 2021-02-23 | End: 2021-03-11

## 2021-02-23 RX ORDER — ONDANSETRON 4 MG/1
4 TABLET, ORALLY DISINTEGRATING ORAL EVERY 6 HOURS PRN
Qty: 10 TABLET | Refills: 0 | OUTPATIENT
Start: 2021-02-23 | End: 2021-03-11

## 2021-02-25 ENCOUNTER — HOSPITAL ENCOUNTER (EMERGENCY)
Facility: HOSPITAL | Age: 26
Discharge: HOME | End: 2021-02-25
Attending: EMERGENCY MEDICINE | Admitting: EMERGENCY MEDICINE

## 2021-02-25 VITALS
WEIGHT: 210 LBS | HEART RATE: 97 BPM | TEMPERATURE: 98 F | BODY MASS INDEX: 41.23 KG/M2 | RESPIRATION RATE: 18 BRPM | HEIGHT: 60 IN | SYSTOLIC BLOOD PRESSURE: 107 MMHG | OXYGEN SATURATION: 97 % | DIASTOLIC BLOOD PRESSURE: 70 MMHG

## 2021-02-25 DIAGNOSIS — T78.40XA ALLERGIC REACTION TO DRUG, INITIAL ENCOUNTER: Primary | ICD-10-CM

## 2021-02-25 PROCEDURE — 25010000002 DIPHENHYDRAMINE PER 50 MG: Performed by: PHYSICIAN ASSISTANT

## 2021-02-25 PROCEDURE — 25010000002 METHYLPREDNISOLONE PER 125 MG: Performed by: PHYSICIAN ASSISTANT

## 2021-02-25 PROCEDURE — 96375 TX/PRO/DX INJ NEW DRUG ADDON: CPT

## 2021-02-25 PROCEDURE — 96374 THER/PROPH/DIAG INJ IV PUSH: CPT

## 2021-02-25 PROCEDURE — 99283 EMERGENCY DEPT VISIT LOW MDM: CPT

## 2021-02-25 RX ORDER — METHYLPREDNISOLONE SODIUM SUCCINATE 125 MG/2ML
125 INJECTION, POWDER, LYOPHILIZED, FOR SOLUTION INTRAMUSCULAR; INTRAVENOUS ONCE
Status: COMPLETED | OUTPATIENT
Start: 2021-02-25 | End: 2021-02-25

## 2021-02-25 RX ORDER — FAMOTIDINE 10 MG/ML
20 INJECTION, SOLUTION INTRAVENOUS ONCE
Status: COMPLETED | OUTPATIENT
Start: 2021-02-25 | End: 2021-02-25

## 2021-02-25 RX ORDER — DIPHENHYDRAMINE HYDROCHLORIDE 50 MG/ML
25 INJECTION INTRAMUSCULAR; INTRAVENOUS ONCE
Status: COMPLETED | OUTPATIENT
Start: 2021-02-25 | End: 2021-02-25

## 2021-02-25 RX ORDER — SODIUM CHLORIDE 0.9 % (FLUSH) 0.9 %
10 SYRINGE (ML) INJECTION AS NEEDED
Status: DISCONTINUED | OUTPATIENT
Start: 2021-02-25 | End: 2021-02-26 | Stop reason: HOSPADM

## 2021-02-25 RX ORDER — MUPIROCIN CALCIUM 20 MG/G
CREAM TOPICAL 3 TIMES DAILY
Qty: 15 G | Refills: 0 | OUTPATIENT
Start: 2021-02-25 | End: 2021-03-11

## 2021-02-25 RX ADMIN — DIPHENHYDRAMINE HYDROCHLORIDE 25 MG: 50 INJECTION, SOLUTION INTRAMUSCULAR; INTRAVENOUS at 20:56

## 2021-02-25 RX ADMIN — METHYLPREDNISOLONE SODIUM SUCCINATE 125 MG: 125 INJECTION, POWDER, FOR SOLUTION INTRAMUSCULAR; INTRAVENOUS at 20:57

## 2021-02-25 RX ADMIN — FAMOTIDINE 20 MG: 10 INJECTION INTRAVENOUS at 20:56

## 2021-04-25 ENCOUNTER — HOSPITAL ENCOUNTER (EMERGENCY)
Facility: HOSPITAL | Age: 26
Discharge: HOME | End: 2021-04-25
Attending: EMERGENCY MEDICINE | Admitting: EMERGENCY MEDICINE

## 2021-04-25 ENCOUNTER — APPOINTMENT (OUTPATIENT)
Dept: CT IMAGING | Facility: HOSPITAL | Age: 26
End: 2021-04-25

## 2021-04-25 VITALS
HEART RATE: 85 BPM | HEIGHT: 60 IN | RESPIRATION RATE: 16 BRPM | DIASTOLIC BLOOD PRESSURE: 75 MMHG | OXYGEN SATURATION: 97 % | BODY MASS INDEX: 39.97 KG/M2 | SYSTOLIC BLOOD PRESSURE: 113 MMHG | TEMPERATURE: 97.9 F | WEIGHT: 203.6 LBS

## 2021-04-25 DIAGNOSIS — R11.2 NON-INTRACTABLE VOMITING WITH NAUSEA, UNSPECIFIED VOMITING TYPE: ICD-10-CM

## 2021-04-25 DIAGNOSIS — R10.9 ABDOMINAL PAIN, UNSPECIFIED ABDOMINAL LOCATION: Primary | ICD-10-CM

## 2021-04-25 DIAGNOSIS — R10.9 RIGHT FLANK PAIN: ICD-10-CM

## 2021-04-25 LAB
ALBUMIN SERPL-MCNC: 4.3 G/DL (ref 3.5–5.2)
ALBUMIN/GLOB SERPL: 1.5 G/DL
ALP SERPL-CCNC: 101 U/L (ref 39–117)
ALT SERPL W P-5'-P-CCNC: 14 U/L (ref 1–33)
ANION GAP SERPL CALCULATED.3IONS-SCNC: 10.1 MMOL/L (ref 5–15)
AST SERPL-CCNC: 18 U/L (ref 1–32)
B-HCG UR QL: NEGATIVE
BASOPHILS # BLD AUTO: 0.06 10*3/MM3 (ref 0–0.2)
BASOPHILS NFR BLD AUTO: 0.8 % (ref 0–1.5)
BILIRUB SERPL-MCNC: 0.8 MG/DL (ref 0–1.2)
BILIRUB UR QL STRIP: NEGATIVE
BUN SERPL-MCNC: 8 MG/DL (ref 6–20)
BUN/CREAT SERPL: 10.8 (ref 7–25)
CALCIUM SPEC-SCNC: 9.2 MG/DL (ref 8.6–10.5)
CHLORIDE SERPL-SCNC: 104 MMOL/L (ref 98–107)
CLARITY UR: ABNORMAL
CO2 SERPL-SCNC: 24.9 MMOL/L (ref 22–29)
COLOR UR: YELLOW
CREAT SERPL-MCNC: 0.74 MG/DL (ref 0.57–1)
DEPRECATED RDW RBC AUTO: 41.8 FL (ref 37–54)
EOSINOPHIL # BLD AUTO: 0.13 10*3/MM3 (ref 0–0.4)
EOSINOPHIL NFR BLD AUTO: 1.7 % (ref 0.3–6.2)
ERYTHROCYTE [DISTWIDTH] IN BLOOD BY AUTOMATED COUNT: 12.3 % (ref 12.3–15.4)
GFR SERPL CREATININE-BSD FRML MDRD: 115 ML/MIN/1.73
GLOBULIN UR ELPH-MCNC: 2.9 GM/DL
GLUCOSE SERPL-MCNC: 88 MG/DL (ref 65–99)
GLUCOSE UR STRIP-MCNC: NEGATIVE MG/DL
HCT VFR BLD AUTO: 40.9 % (ref 34–46.6)
HGB BLD-MCNC: 13.8 G/DL (ref 12–15.9)
HGB UR QL STRIP.AUTO: NEGATIVE
HOLD SPECIMEN: NORMAL
IMM GRANULOCYTES # BLD AUTO: 0.02 10*3/MM3 (ref 0–0.05)
IMM GRANULOCYTES NFR BLD AUTO: 0.3 % (ref 0–0.5)
KETONES UR QL STRIP: ABNORMAL
LEUKOCYTE ESTERASE UR QL STRIP.AUTO: NEGATIVE
LIPASE SERPL-CCNC: 29 U/L (ref 13–60)
LYMPHOCYTES # BLD AUTO: 3.39 10*3/MM3 (ref 0.7–3.1)
LYMPHOCYTES NFR BLD AUTO: 45 % (ref 19.6–45.3)
MCH RBC QN AUTO: 31.2 PG (ref 26.6–33)
MCHC RBC AUTO-ENTMCNC: 33.7 G/DL (ref 31.5–35.7)
MCV RBC AUTO: 92.3 FL (ref 79–97)
MONOCYTES # BLD AUTO: 0.52 10*3/MM3 (ref 0.1–0.9)
MONOCYTES NFR BLD AUTO: 6.9 % (ref 5–12)
NEUTROPHILS NFR BLD AUTO: 3.41 10*3/MM3 (ref 1.7–7)
NEUTROPHILS NFR BLD AUTO: 45.3 % (ref 42.7–76)
NITRITE UR QL STRIP: NEGATIVE
NRBC BLD AUTO-RTO: 0 /100 WBC (ref 0–0.2)
PH UR STRIP.AUTO: 7 [PH] (ref 5–8)
PLATELET # BLD AUTO: 288 10*3/MM3 (ref 140–450)
PMV BLD AUTO: 9.2 FL (ref 6–12)
POTASSIUM SERPL-SCNC: 3.7 MMOL/L (ref 3.5–5.2)
PROT SERPL-MCNC: 7.2 G/DL (ref 6–8.5)
PROT UR QL STRIP: NEGATIVE
RBC # BLD AUTO: 4.43 10*6/MM3 (ref 3.77–5.28)
SODIUM SERPL-SCNC: 139 MMOL/L (ref 136–145)
SP GR UR STRIP: >=1.03 (ref 1–1.03)
UROBILINOGEN UR QL STRIP: ABNORMAL
WBC # BLD AUTO: 7.53 10*3/MM3 (ref 3.4–10.8)
WHOLE BLOOD HOLD SPECIMEN: NORMAL
WHOLE BLOOD HOLD SPECIMEN: NORMAL

## 2021-04-25 PROCEDURE — 99283 EMERGENCY DEPT VISIT LOW MDM: CPT

## 2021-04-25 PROCEDURE — 74176 CT ABD & PELVIS W/O CONTRAST: CPT

## 2021-04-25 PROCEDURE — 83690 ASSAY OF LIPASE: CPT | Performed by: EMERGENCY MEDICINE

## 2021-04-25 PROCEDURE — 96375 TX/PRO/DX INJ NEW DRUG ADDON: CPT

## 2021-04-25 PROCEDURE — 96374 THER/PROPH/DIAG INJ IV PUSH: CPT

## 2021-04-25 PROCEDURE — 80053 COMPREHEN METABOLIC PANEL: CPT | Performed by: EMERGENCY MEDICINE

## 2021-04-25 PROCEDURE — 25010000002 KETOROLAC TROMETHAMINE PER 15 MG: Performed by: EMERGENCY MEDICINE

## 2021-04-25 PROCEDURE — 85025 COMPLETE CBC W/AUTO DIFF WBC: CPT | Performed by: EMERGENCY MEDICINE

## 2021-04-25 PROCEDURE — 81025 URINE PREGNANCY TEST: CPT | Performed by: EMERGENCY MEDICINE

## 2021-04-25 PROCEDURE — 25010000002 ONDANSETRON PER 1 MG: Performed by: EMERGENCY MEDICINE

## 2021-04-25 PROCEDURE — 81003 URINALYSIS AUTO W/O SCOPE: CPT | Performed by: EMERGENCY MEDICINE

## 2021-04-25 RX ORDER — ONDANSETRON 4 MG/1
4 TABLET, ORALLY DISINTEGRATING ORAL EVERY 6 HOURS PRN
Qty: 10 TABLET | Refills: 0 | OUTPATIENT
Start: 2021-04-25 | End: 2021-06-03

## 2021-04-25 RX ORDER — KETOROLAC TROMETHAMINE 30 MG/ML
30 INJECTION, SOLUTION INTRAMUSCULAR; INTRAVENOUS ONCE
Status: COMPLETED | OUTPATIENT
Start: 2021-04-25 | End: 2021-04-25

## 2021-04-25 RX ORDER — IBUPROFEN 800 MG/1
800 TABLET ORAL EVERY 8 HOURS PRN
Qty: 60 TABLET | Refills: 0 | Status: SHIPPED | OUTPATIENT
Start: 2021-04-25

## 2021-04-25 RX ORDER — ONDANSETRON 2 MG/ML
4 INJECTION INTRAMUSCULAR; INTRAVENOUS ONCE
Status: COMPLETED | OUTPATIENT
Start: 2021-04-25 | End: 2021-04-25

## 2021-04-25 RX ADMIN — ONDANSETRON 4 MG: 2 INJECTION INTRAMUSCULAR; INTRAVENOUS at 21:32

## 2021-04-25 RX ADMIN — SODIUM CHLORIDE 1000 ML: 9 INJECTION, SOLUTION INTRAVENOUS at 21:31

## 2021-04-25 RX ADMIN — KETOROLAC TROMETHAMINE 30 MG: 30 INJECTION, SOLUTION INTRAMUSCULAR at 21:34

## 2021-04-26 NOTE — ED PROVIDER NOTES
Subjective   25-year-old female presenting with abdominal pain and flank pain.  She states that for last couple days she has had increasing right-sided abdominal pain and flank pain.  She describes this as initially a dull pain that then become sharp and then abates.  No specific alleviating or aggravating or inciting factors.  Has gotten more severe.  It is associated with nausea.  She denies any fevers, chills, vomiting, diarrhea, urinary complaints.  She notes that she called her primary doctor who instructed her to come to the ER to be evaluated for kidney stones.          Review of Systems   Constitutional: Negative.    HENT: Negative.    Eyes: Negative.    Respiratory: Negative.    Cardiovascular: Negative.    Gastrointestinal: Positive for abdominal pain and nausea. Negative for vomiting.   Genitourinary: Positive for flank pain. Negative for dysuria and hematuria.   Skin: Negative.    Neurological: Negative.    Psychiatric/Behavioral: Negative.        Past Medical History:   Diagnosis Date   • Asthma    • Chlamydia    • Gestational hypertension    • Gonorrhea    • Peptic ulceration        Allergies   Allergen Reactions   • Coconut Oil Anaphylaxis   • Amoxicillin Itching     Difficulty swallowing       Past Surgical History:   Procedure Laterality Date   •  SECTION  2012   •  SECTION  2014   •  SECTION     •  SECTION WITH TUBAL N/A 2017    Procedure:  SECTION REPEAT WITH TUBAL;  Surgeon: Cinthya Garcia MD;  Location: [facility];  Service:    • NO PAST SURGERIES     • TUBAL ABDOMINAL LIGATION         Family History   Problem Relation Age of Onset   • Hypertension Maternal Grandmother        Social History     Socioeconomic History   • Marital status:      Spouse name: Not on file   • Number of children: Not on file   • Years of education: Not on file   • Highest education level: Not on file   Tobacco Use   • Smoking status: Former Smoker      Packs/day: 0.25     Types: Cigarettes   • Smokeless tobacco: Never Used   Vaping Use   • Vaping Use: Every day   • Substances: Nicotine   Substance and Sexual Activity   • Alcohol use: Yes     Comment: rarely   • Drug use: No   • Sexual activity: Yes     Partners: Male, Female     Birth control/protection: Surgical     Comment: She has a , and reports they have a girlfriend.           Objective   Physical Exam  Constitutional:       General: She is not in acute distress.     Appearance: Normal appearance. She is not ill-appearing, toxic-appearing or diaphoretic.   HENT:      Head: Normocephalic and atraumatic.      Right Ear: External ear normal.      Left Ear: External ear normal.      Nose: Nose normal.      Mouth/Throat:      Mouth: Mucous membranes are moist.      Pharynx: Oropharynx is clear.   Eyes:      Extraocular Movements: Extraocular movements intact.      Conjunctiva/sclera: Conjunctivae normal.      Pupils: Pupils are equal, round, and reactive to light.   Cardiovascular:      Rate and Rhythm: Normal rate and regular rhythm.      Pulses: Normal pulses.      Heart sounds: Normal heart sounds.   Pulmonary:      Effort: Pulmonary effort is normal. No respiratory distress.      Breath sounds: Normal breath sounds.   Abdominal:      General: Bowel sounds are normal. There is no distension.      Comments: Mild right upper and right lower quadrant tenderness, right CVA tenderness   Musculoskeletal:         General: No swelling, tenderness or deformity. Normal range of motion.      Cervical back: Normal range of motion.   Skin:     General: Skin is warm and dry.      Capillary Refill: Capillary refill takes less than 2 seconds.      Findings: No rash.   Neurological:      General: No focal deficit present.      Mental Status: She is alert and oriented to person, place, and time.   Psychiatric:         Mood and Affect: Mood normal.         Behavior: Behavior normal.         Procedures           ED  Course                                           MDM  Number of Diagnoses or Management Options  Abdominal pain, unspecified abdominal location  Non-intractable vomiting with nausea, unspecified vomiting type  Right flank pain  Diagnosis management comments: 25-year-old female with abdominal and flank pain.  Well-developed well-nourished young female no distress with exam as above.  She does have some right-sided abdominal and right-sided flank tenderness.  Will obtain labs, urinalysis, CT scan.  Will give symptomatic treatment.  Disposition pending.    DDx: Kidney stone, UTI, musculoskeletal pain, appendicitis, biliary disease    Work-up here is overwhelmingly unremarkable.  She feels well, resting comfortably.  I do feel she is safe for discharge home.  We discussed supportive measures and outpatient follow-up.  She is comfortable with understanding the plan.      Final diagnoses:   Abdominal pain, unspecified abdominal location   Right flank pain   Non-intractable vomiting with nausea, unspecified vomiting type        Jose Ordonez MD  04/25/21 8799

## 2021-07-14 DIAGNOSIS — N30.00 ACUTE CYSTITIS WITHOUT HEMATURIA: Primary | ICD-10-CM

## 2021-07-14 RX ORDER — NITROFURANTOIN 25; 75 MG/1; MG/1
100 CAPSULE ORAL 2 TIMES DAILY
Qty: 20 CAPSULE | Refills: 0 | Status: SHIPPED | OUTPATIENT
Start: 2021-07-14 | End: 2023-03-07

## 2021-08-31 ENCOUNTER — TRANSCRIBE ORDERS (OUTPATIENT)
Dept: LAB | Facility: HOSPITAL | Age: 26
End: 2021-08-31

## 2021-08-31 ENCOUNTER — LAB (OUTPATIENT)
Dept: LAB | Facility: HOSPITAL | Age: 26
End: 2021-08-31

## 2021-08-31 DIAGNOSIS — Z20.822 COVID-19 RULED OUT: Primary | ICD-10-CM

## 2021-08-31 DIAGNOSIS — Z20.822 COVID-19 RULED OUT: ICD-10-CM

## 2021-08-31 LAB — SARS-COV-2 RNA NOSE QL NAA+PROBE: NOT DETECTED

## 2021-08-31 PROCEDURE — U0004 COV-19 TEST NON-CDC HGH THRU: HCPCS

## 2021-08-31 PROCEDURE — C9803 HOPD COVID-19 SPEC COLLECT: HCPCS

## 2021-09-01 ENCOUNTER — TELEPHONE (OUTPATIENT)
Dept: PREADMISSION TESTING | Facility: HOSPITAL | Age: 26
End: 2021-09-01

## 2021-09-17 ENCOUNTER — LAB (OUTPATIENT)
Dept: LAB | Facility: HOSPITAL | Age: 26
End: 2021-09-17

## 2021-09-17 ENCOUNTER — TRANSCRIBE ORDERS (OUTPATIENT)
Dept: LAB | Facility: HOSPITAL | Age: 26
End: 2021-09-17

## 2021-09-17 DIAGNOSIS — Z20.822 COVID-19 RULED OUT: ICD-10-CM

## 2021-09-17 DIAGNOSIS — Z20.822 COVID-19 RULED OUT: Primary | ICD-10-CM

## 2021-09-17 LAB — SARS-COV-2 RNA NOSE QL NAA+PROBE: NOT DETECTED

## 2021-09-17 PROCEDURE — U0004 COV-19 TEST NON-CDC HGH THRU: HCPCS

## 2021-09-17 PROCEDURE — C9803 HOPD COVID-19 SPEC COLLECT: HCPCS

## 2022-03-05 ENCOUNTER — HOSPITAL ENCOUNTER (EMERGENCY)
Facility: HOSPITAL | Age: 27
Discharge: HOME | End: 2022-03-05
Attending: EMERGENCY MEDICINE | Admitting: EMERGENCY MEDICINE

## 2022-03-05 VITALS
BODY MASS INDEX: 37.26 KG/M2 | RESPIRATION RATE: 16 BRPM | OXYGEN SATURATION: 100 % | DIASTOLIC BLOOD PRESSURE: 72 MMHG | SYSTOLIC BLOOD PRESSURE: 127 MMHG | HEIGHT: 60 IN | TEMPERATURE: 98.5 F | WEIGHT: 189.8 LBS | HEART RATE: 86 BPM

## 2022-03-05 DIAGNOSIS — R35.89 POLYURIA: Primary | ICD-10-CM

## 2022-03-05 LAB
ALBUMIN SERPL-MCNC: 4 G/DL (ref 3.5–5.2)
ALBUMIN/GLOB SERPL: 1.5 G/DL
ALP SERPL-CCNC: 94 U/L (ref 39–117)
ALT SERPL W P-5'-P-CCNC: 20 U/L (ref 1–33)
ANION GAP SERPL CALCULATED.3IONS-SCNC: 11.6 MMOL/L (ref 5–15)
AST SERPL-CCNC: 32 U/L (ref 1–32)
B-HCG UR QL: NEGATIVE
BACTERIA UR QL AUTO: ABNORMAL /HPF
BASOPHILS # BLD AUTO: 0.04 10*3/MM3 (ref 0–0.2)
BASOPHILS NFR BLD AUTO: 0.7 % (ref 0–1.5)
BILIRUB SERPL-MCNC: 0.8 MG/DL (ref 0–1.2)
BILIRUB UR QL STRIP: NEGATIVE
BUN SERPL-MCNC: 6 MG/DL (ref 6–20)
BUN/CREAT SERPL: 7.8 (ref 7–25)
CALCIUM SPEC-SCNC: 8.9 MG/DL (ref 8.6–10.5)
CHLORIDE SERPL-SCNC: 102 MMOL/L (ref 98–107)
CLARITY UR: ABNORMAL
CO2 SERPL-SCNC: 22.4 MMOL/L (ref 22–29)
COLOR UR: YELLOW
CREAT SERPL-MCNC: 0.77 MG/DL (ref 0.57–1)
DEPRECATED RDW RBC AUTO: 42.2 FL (ref 37–54)
EGFRCR SERPLBLD CKD-EPI 2021: 108.6 ML/MIN/1.73
EOSINOPHIL # BLD AUTO: 0.05 10*3/MM3 (ref 0–0.4)
EOSINOPHIL NFR BLD AUTO: 0.9 % (ref 0.3–6.2)
ERYTHROCYTE [DISTWIDTH] IN BLOOD BY AUTOMATED COUNT: 12.6 % (ref 12.3–15.4)
GLOBULIN UR ELPH-MCNC: 2.6 GM/DL
GLUCOSE SERPL-MCNC: 100 MG/DL (ref 65–99)
GLUCOSE UR STRIP-MCNC: NEGATIVE MG/DL
HCT VFR BLD AUTO: 36.8 % (ref 34–46.6)
HGB BLD-MCNC: 12.4 G/DL (ref 12–15.9)
HGB UR QL STRIP.AUTO: NEGATIVE
HYALINE CASTS UR QL AUTO: ABNORMAL /LPF
IMM GRANULOCYTES # BLD AUTO: 0.01 10*3/MM3 (ref 0–0.05)
IMM GRANULOCYTES NFR BLD AUTO: 0.2 % (ref 0–0.5)
KETONES UR QL STRIP: NEGATIVE
LEUKOCYTE ESTERASE UR QL STRIP.AUTO: ABNORMAL
LYMPHOCYTES # BLD AUTO: 2.28 10*3/MM3 (ref 0.7–3.1)
LYMPHOCYTES NFR BLD AUTO: 39.1 % (ref 19.6–45.3)
MCH RBC QN AUTO: 30.9 PG (ref 26.6–33)
MCHC RBC AUTO-ENTMCNC: 33.7 G/DL (ref 31.5–35.7)
MCV RBC AUTO: 91.8 FL (ref 79–97)
MONOCYTES # BLD AUTO: 0.33 10*3/MM3 (ref 0.1–0.9)
MONOCYTES NFR BLD AUTO: 5.7 % (ref 5–12)
NEUTROPHILS NFR BLD AUTO: 3.12 10*3/MM3 (ref 1.7–7)
NEUTROPHILS NFR BLD AUTO: 53.4 % (ref 42.7–76)
NITRITE UR QL STRIP: NEGATIVE
NRBC BLD AUTO-RTO: 0 /100 WBC (ref 0–0.2)
PH UR STRIP.AUTO: 6.5 [PH] (ref 5–8)
PLATELET # BLD AUTO: 241 10*3/MM3 (ref 140–450)
PMV BLD AUTO: 9.2 FL (ref 6–12)
POTASSIUM SERPL-SCNC: 3.2 MMOL/L (ref 3.5–5.2)
PROT SERPL-MCNC: 6.6 G/DL (ref 6–8.5)
PROT UR QL STRIP: NEGATIVE
RBC # BLD AUTO: 4.01 10*6/MM3 (ref 3.77–5.28)
RBC # UR STRIP: ABNORMAL /HPF
REF LAB TEST METHOD: ABNORMAL
SODIUM SERPL-SCNC: 136 MMOL/L (ref 136–145)
SP GR UR STRIP: <=1.005 (ref 1–1.03)
SQUAMOUS #/AREA URNS HPF: ABNORMAL /HPF
UROBILINOGEN UR QL STRIP: ABNORMAL
WBC # UR STRIP: ABNORMAL /HPF
WBC NRBC COR # BLD: 5.83 10*3/MM3 (ref 3.4–10.8)

## 2022-03-05 PROCEDURE — 81001 URINALYSIS AUTO W/SCOPE: CPT | Performed by: PHYSICIAN ASSISTANT

## 2022-03-05 PROCEDURE — 85025 COMPLETE CBC W/AUTO DIFF WBC: CPT | Performed by: PHYSICIAN ASSISTANT

## 2022-03-05 PROCEDURE — 93005 ELECTROCARDIOGRAM TRACING: CPT

## 2022-03-05 PROCEDURE — 81025 URINE PREGNANCY TEST: CPT | Performed by: PHYSICIAN ASSISTANT

## 2022-03-05 PROCEDURE — 99283 EMERGENCY DEPT VISIT LOW MDM: CPT

## 2022-03-05 PROCEDURE — 80053 COMPREHEN METABOLIC PANEL: CPT | Performed by: PHYSICIAN ASSISTANT

## 2022-03-05 RX ORDER — SODIUM CHLORIDE 0.9 % (FLUSH) 0.9 %
10 SYRINGE (ML) INJECTION AS NEEDED
Status: DISCONTINUED | OUTPATIENT
Start: 2022-03-05 | End: 2022-03-05 | Stop reason: HOSPADM

## 2022-03-05 RX ORDER — BUSPIRONE HYDROCHLORIDE 15 MG/1
15 TABLET ORAL 3 TIMES DAILY
COMMUNITY
End: 2023-03-07

## 2022-03-05 RX ORDER — ESCITALOPRAM OXALATE 10 MG/1
10 TABLET ORAL DAILY
COMMUNITY
End: 2023-03-07

## 2022-03-05 RX ADMIN — SODIUM CHLORIDE 1000 ML: 9 INJECTION, SOLUTION INTRAVENOUS at 19:22

## 2022-03-06 NOTE — ED PROVIDER NOTES
Subjective   Chief Complaint: Dry mouth, frequent urination, some blurry vision more in the periphery, polyuria polydipsia  History of Present Illness: 26-year-old female comes in for evaluation above complaints.  She has had the symptoms for about a week.  Her grandmother stated there is diabetes in the family she came to be evaluated for this.  She has no chest pain or shortness of breath.  No headache nausea vomit diarrhea abdominal pain no numbness tingling or focal weakness.  No personal history of diabetes.  She states she does frequently get some bacterial vaginosis but has no vaginal discharge or itching at this time.  Onset: A week ago  Timing: Ongoing  Exacerbating / Alleviating factors: None  Associated symptoms: None      Nurses Notes reviewed and agree, including vitals, allergies, social history and prior medical history.          Review of Systems   Constitutional: Negative.    HENT: Negative.         Dry mouth   Eyes:        Blurry vision   Respiratory: Negative.    Cardiovascular: Negative.    Gastrointestinal: Negative.    Endocrine: Positive for polydipsia and polyphagia.   Genitourinary: Negative.    Musculoskeletal: Negative.    Skin: Negative.    Neurological: Negative.    Psychiatric/Behavioral: Negative.        Past Medical History:   Diagnosis Date   • Asthma    • Chlamydia    • Gestational hypertension    • Gonorrhea    • Peptic ulceration        Allergies   Allergen Reactions   • Coconut Oil Anaphylaxis   • Amoxicillin Itching     Difficulty swallowing   • Zoloft [Sertraline Hcl] Hives       Past Surgical History:   Procedure Laterality Date   •  SECTION  2012   •  SECTION  2014   •  SECTION     •  SECTION WITH TUBAL N/A 2017    Procedure:  SECTION REPEAT WITH TUBAL;  Surgeon: Cinthya Garcia MD;  Location: [facility];  Service:    • NO PAST SURGERIES     • TUBAL ABDOMINAL LIGATION         Family History   Problem Relation Age of  Onset   • Hypertension Maternal Grandmother        Social History     Socioeconomic History   • Marital status:    Tobacco Use   • Smoking status: Former Smoker     Packs/day: 0.25     Types: Cigarettes   • Smokeless tobacco: Never Used   Vaping Use   • Vaping Use: Every day   • Substances: Nicotine   Substance and Sexual Activity   • Alcohol use: Yes     Comment: rarely   • Drug use: No   • Sexual activity: Yes     Partners: Male, Female     Birth control/protection: Surgical     Comment: She has a , and reports they have a girlfriend.           Objective   Physical Exam  Vitals and nursing note reviewed.   Constitutional:       General: She is not in acute distress.     Appearance: Normal appearance. She is not ill-appearing, toxic-appearing or diaphoretic.   HENT:      Head: Normocephalic and atraumatic.      Nose: Nose normal.      Mouth/Throat:      Mouth: Mucous membranes are moist.      Pharynx: Oropharynx is clear.   Eyes:      Extraocular Movements: Extraocular movements intact.   Cardiovascular:      Rate and Rhythm: Normal rate and regular rhythm.   Pulmonary:      Effort: Pulmonary effort is normal.   Abdominal:      General: Abdomen is flat.      Palpations: Abdomen is soft.      Tenderness: There is no abdominal tenderness.   Musculoskeletal:         General: Normal range of motion.      Cervical back: Normal range of motion.   Skin:     General: Skin is warm and dry.   Neurological:      General: No focal deficit present.      Mental Status: She is alert and oriented to person, place, and time. Mental status is at baseline.      GCS: GCS eye subscore is 4. GCS verbal subscore is 5. GCS motor subscore is 6.      Cranial Nerves: Cranial nerves are intact.      Sensory: Sensation is intact.      Motor: Motor function is intact.   Psychiatric:         Mood and Affect: Mood normal.         Procedures           ED Course  ED Course as of 03/06/22 0131   Sat Mar 05, 2022   1853 EKG interpreted  by me reveals sinus rhythm with a rate of 87 bpm.  There is locators voltage in chest leads.  There are some nonspecific T wave changes.  This is an atypical appearing EKG. [TB]   1955 Glucose(!): 100 [TM]   1956 Potassium(!): 3.2 [TM]      ED Course User Index  [TB] Patty Sesay MD  [TM] Gera Marcus PA-C      Awaiting labs and EKG< discussed with Dr. Lowry, transition of care to him at this time.  Updated patient on normal labs, awaiting EKG and urinalysis at this time.                                           MDM  26-year-old female presented to the ED with initial complaint of polyuria polydipsia concern for possible diabetes.  Labs reassuring.  Work-up reassuring.  Appropriate for discharge follow-up outpatient.      Final diagnoses:   Polyuria       ED Disposition  ED Disposition     ED Disposition   Discharge    Condition   Stable    Comment   --             Cinthya Lozada APRN [address] [facility] [id number] [phone]    Schedule an appointment as soon as possible for a visit       [facility]  [address] [address] [phone]    If symptoms worsen    Cinthya Lozada APRN [address]  [place] [address]  [phone]               Medication List      No changes were made to your prescriptions during this visit.          Silvio Lowry,   03/06/22 0131

## 2022-03-06 NOTE — DISCHARGE INSTRUCTIONS
Please follow-up with your family doctor outpatient by calling Monday.  If you have any other new or concerning symptoms and feels that you need to be reevaluated please not hesitate to return to the ER.  Your blood glucose was normal here at 100.  No signs of significant dehydration or anemia.

## 2022-07-26 ENCOUNTER — TELEPHONE (OUTPATIENT)
Dept: SURGERY | Facility: CLINIC | Age: 27
End: 2022-07-26

## 2022-07-26 NOTE — TELEPHONE ENCOUNTER
Patient no showed for appointment with Dr Whatley. Called patient no answer,left message to call office and reschedule

## 2023-03-07 ENCOUNTER — OFFICE VISIT (OUTPATIENT)
Dept: OBSTETRICS AND GYNECOLOGY | Facility: CLINIC | Age: 28
End: 2023-03-07
Payer: COMMERCIAL

## 2023-03-07 VITALS
SYSTOLIC BLOOD PRESSURE: 108 MMHG | DIASTOLIC BLOOD PRESSURE: 70 MMHG | WEIGHT: 196.4 LBS | HEIGHT: 60 IN | BODY MASS INDEX: 38.56 KG/M2

## 2023-03-07 DIAGNOSIS — N94.6 DYSMENORRHEA: Primary | ICD-10-CM

## 2023-03-07 PROCEDURE — 99214 OFFICE O/P EST MOD 30 MIN: CPT | Performed by: PHYSICIAN ASSISTANT

## 2023-03-07 NOTE — PROGRESS NOTES
Subjective   Chief Complaint   Patient presents with   • Menstrual Problem     Patient states she is having severe pain with periods       Bessie Padilla is a 27 y.o. year old  presenting to be seen for complaint of severe dysmenorrhea  Reports has experienced excruciating dysmenorrhea since having tubal 2017. Severe lower pelvic pain and cramping starts about 3 days before onset of menses and during menses. She can't work, can't take care of her kids due to severe dysmenorrhea as pretty much bedridden. Sometimes down in floor doubled up in pain. Reports menses are regular q 28 days with 4-5 day bleeds. LMP 2023  She is adamant she wants hysterectomy for definitive management. Reports has tried ocp previously which was not helpful with dysmenorrhea. She does not want to try more conservative medical options.     3 previous  sections.   Had normal pap 2020     Past Medical History:   Diagnosis Date   • Anxiety    • Asthma    • Chlamydia    • Depression    • Gestational hypertension    • Gonorrhea    • Multiple gestation 14   • Ovarian cyst    • Peptic ulceration    • PMS (premenstrual syndrome)    • Polycystic ovary syndrome    • Preeclampsia    • Varicella         Current Outpatient Medications:   •  ibuprofen (ADVIL,MOTRIN) 800 MG tablet, Take 1 tablet by mouth Every 8 (Eight) Hours As Needed for Mild Pain ., Disp: 60 tablet, Rfl: 0   Allergies   Allergen Reactions   • Coconut Oil Anaphylaxis   • Amoxicillin Itching     Difficulty swallowing   • Zoloft [Sertraline Hcl] Hives      Past Surgical History:   Procedure Laterality Date   •  SECTION  2012   •  SECTION  2014   •  SECTION     •  SECTION WITH TUBAL N/A 2017    Procedure:  SECTION REPEAT WITH TUBAL;  Surgeon: Cinthya Garcia MD;  Location: [facility];  Service:    • NO PAST SURGERIES     • TUBAL ABDOMINAL LIGATION        Social History  "    Socioeconomic History   • Marital status:    Tobacco Use   • Smoking status: Some Days     Packs/day: 0.25     Types: Cigarettes, Electronic Cigarette   • Smokeless tobacco: Never   Vaping Use   • Vaping Use: Every day   • Substances: Nicotine   Substance and Sexual Activity   • Alcohol use: Yes     Comment: rarely   • Drug use: No   • Sexual activity: Yes     Partners: Female, Male     Birth control/protection: Surgical     Comment: She has a , and reports they have a girlfriend.      Family History   Problem Relation Age of Onset   • Hypertension Maternal Grandmother    • Stroke Paternal Grandmother    • Diabetes Paternal Grandmother    • Ovarian cancer Maternal Aunt        Review of Systems   Constitutional: Negative for chills, diaphoresis and fever.   Gastrointestinal: Negative for constipation, diarrhea, nausea and vomiting.   Genitourinary: Positive for menstrual problem and pelvic pain. Negative for difficulty urinating, dysuria and vaginal discharge.           Objective   /70   Ht 152.4 cm (60\")   Wt 89.1 kg (196 lb 6.4 oz)   LMP 02/20/2023 (Exact Date)   BMI 38.36 kg/m²     Physical Exam  Constitutional:       Appearance: Normal appearance. She is well-developed and well-groomed.   Eyes:      General: Lids are normal.      Extraocular Movements: Extraocular movements intact.      Conjunctiva/sclera: Conjunctivae normal.   Genitourinary:     Labia:         Right: No rash, tenderness or lesion.         Left: No rash, tenderness or lesion.       Urethra: No prolapse, urethral pain, urethral swelling or urethral lesion.      Vagina: No vaginal discharge, tenderness or lesions.      Cervix: No cervical motion tenderness, discharge, friability or lesion.      Uterus: Not enlarged and not tender.       Adnexa:         Right: No mass or tenderness.          Left: No mass or tenderness.     Neurological:      Mental Status: She is alert.   Psychiatric:         Attention and Perception: " Attention normal.         Mood and Affect: Mood normal.         Speech: Speech normal.         Behavior: Behavior is cooperative.            Result Review :           CT Abdomen Pelvis Without Contrast (04/25/2021 22:01)  US Non-ob Transvaginal (12/02/2020 14:22)          Assessment and Plan  Diagnoses and all orders for this visit:    1. Dysmenorrhea (Primary)      Patient Instructions   Patient advised I will consult MD regarding proceeding with hysterectomy and get back with her with MD recommendation  Per consult with Dr. Garcia she may schedule hysterectomy pending negative vaginal cultures and updated pelvic ultrasound              This note was electronically signed.    Tesha Daniel PA-C   March 9, 2023

## 2023-03-07 NOTE — PATIENT INSTRUCTIONS
Patient advised I will consult MD regarding proceeding with hysterectomy and get back with her with MD recommendation  Per consult with Dr. Garcia she may schedule hysterectomy pending negative vaginal cultures and updated pelvic ultrasound

## 2023-03-09 ENCOUNTER — TELEPHONE (OUTPATIENT)
Dept: OBSTETRICS AND GYNECOLOGY | Facility: CLINIC | Age: 28
End: 2023-03-09

## 2023-03-09 ENCOUNTER — PREP FOR SURGERY (OUTPATIENT)
Dept: OTHER | Facility: HOSPITAL | Age: 28
End: 2023-03-09
Payer: COMMERCIAL

## 2023-03-09 ENCOUNTER — DOCUMENTATION (OUTPATIENT)
Dept: OBSTETRICS AND GYNECOLOGY | Facility: CLINIC | Age: 28
End: 2023-03-09

## 2023-03-09 ENCOUNTER — OFFICE VISIT (OUTPATIENT)
Dept: OBSTETRICS AND GYNECOLOGY | Facility: CLINIC | Age: 28
End: 2023-03-09
Payer: COMMERCIAL

## 2023-03-09 VITALS
HEIGHT: 60 IN | WEIGHT: 196 LBS | SYSTOLIC BLOOD PRESSURE: 108 MMHG | DIASTOLIC BLOOD PRESSURE: 70 MMHG | BODY MASS INDEX: 38.48 KG/M2

## 2023-03-09 DIAGNOSIS — N94.6 DYSMENORRHEA: Primary | ICD-10-CM

## 2023-03-09 DIAGNOSIS — Z12.4 ENCOUNTER FOR SCREENING FOR CERVICAL CANCER: ICD-10-CM

## 2023-03-09 DIAGNOSIS — Z11.3 ROUTINE SCREENING FOR STI (SEXUALLY TRANSMITTED INFECTION): ICD-10-CM

## 2023-03-09 PROCEDURE — 99213 OFFICE O/P EST LOW 20 MIN: CPT | Performed by: PHYSICIAN ASSISTANT

## 2023-03-09 PROCEDURE — 1160F RVW MEDS BY RX/DR IN RCRD: CPT | Performed by: PHYSICIAN ASSISTANT

## 2023-03-09 PROCEDURE — 1159F MED LIST DOCD IN RCRD: CPT | Performed by: PHYSICIAN ASSISTANT

## 2023-03-09 RX ORDER — SODIUM CHLORIDE 0.9 % (FLUSH) 0.9 %
10 SYRINGE (ML) INJECTION EVERY 12 HOURS SCHEDULED
OUTPATIENT
Start: 2023-03-09

## 2023-03-09 RX ORDER — PHENAZOPYRIDINE HYDROCHLORIDE 100 MG/1
200 TABLET, FILM COATED ORAL ONCE
OUTPATIENT
Start: 2023-03-09 | End: 2023-03-09

## 2023-03-09 RX ORDER — SODIUM CHLORIDE 0.9 % (FLUSH) 0.9 %
10 SYRINGE (ML) INJECTION AS NEEDED
OUTPATIENT
Start: 2023-03-09

## 2023-03-09 RX ORDER — SODIUM CHLORIDE 9 MG/ML
40 INJECTION, SOLUTION INTRAVENOUS AS NEEDED
OUTPATIENT
Start: 2023-03-09

## 2023-03-09 RX ORDER — CEFAZOLIN SODIUM 2 G/50ML
2 SOLUTION INTRAVENOUS ONCE
OUTPATIENT
Start: 2023-03-09 | End: 2023-03-09

## 2023-03-09 NOTE — TELEPHONE ENCOUNTER
--PATIENT MD JAVI --- Message from Cinthya Garcia MD sent at 3/9/2023  8:03 AM EST -----  Okay for patient to schedule but she needs to understand her increased risk for an open procedure as well as risks of surgery.  I would recommend she have cultures however prior to her surgery first as well as TVS.  Thanks.  ----- Message -----  From: Tesha Daniel PA-C  Sent: 3/8/2023   8:40 AM EST  To: MD Dr. Radha Herrera can you please review. patient is wanting hysterectomy for severe dysmenorrhea. She does not want to try any conservative medical options. 3 c/s and tubal. Thanks

## 2023-03-09 NOTE — PROGRESS NOTES
Subjective   Chief Complaint   Patient presents with   • Follow-up     TVS and vaginal swab done today       Bessie Padilla is a 27 y.o. year old  presenting to be seen for follow-up.  Patient had been seen yesterday and was requesting hysterectomy for chronic, severe, and progressive dysmenorrhea.  Per consult with Dr. Garcia she advised the patient could be scheduled for hysterectomy but she recommended she return for vaginal cultures given a history of STDs in the past and also to get an updated pelvic ultrasound.  Last pap was   Transvaginal ultrasound done today is reviewed and notes the uterus to be normal.  Endometrium is 12 mm (LMP 2023).  Ovaries are normal with small follicles.  Trace amount of free fluid in the cul-de-sac.    Past Medical History:   Diagnosis Date   • Anxiety    • Asthma    • Chlamydia    • Depression    • Gestational hypertension    • Gonorrhea    • Multiple gestation 13   • Ovarian cyst    • Peptic ulceration    • PMS (premenstrual syndrome)    • Polycystic ovary syndrome    • Preeclampsia    • Varicella         Current Outpatient Medications:   •  ibuprofen (ADVIL,MOTRIN) 800 MG tablet, Take 1 tablet by mouth Every 8 (Eight) Hours As Needed for Mild Pain ., Disp: 60 tablet, Rfl: 0   Allergies   Allergen Reactions   • Coconut Oil Anaphylaxis   • Amoxicillin Itching     Difficulty swallowing   • Zoloft [Sertraline Hcl] Hives      Past Surgical History:   Procedure Laterality Date   •  SECTION  2012   •  SECTION  2014   •  SECTION     •  SECTION WITH TUBAL N/A 2017    Procedure:  SECTION REPEAT WITH TUBAL;  Surgeon: Cinthya Garcia MD;  Location: [facility];  Service:    • NO PAST SURGERIES     • TUBAL ABDOMINAL LIGATION        Social History     Socioeconomic History   • Marital status:    Tobacco Use   • Smoking status: Some Days     Packs/day: 0.25     Types: Cigarettes, Electronic  "Cigarette   • Smokeless tobacco: Never   Vaping Use   • Vaping Use: Every day   • Substances: Nicotine   Substance and Sexual Activity   • Alcohol use: Yes     Comment: rarely   • Drug use: No   • Sexual activity: Yes     Partners: Female, Male     Birth control/protection: Surgical     Comment: She has a , and reports they have a girlfriend.      Family History   Problem Relation Age of Onset   • Hypertension Maternal Grandmother    • Stroke Paternal Grandmother    • Diabetes Paternal Grandmother    • Ovarian cancer Maternal Aunt        Review of Systems   Constitutional: Negative for chills, diaphoresis and fever.   Gastrointestinal: Negative.    Genitourinary: Positive for menstrual problem and pelvic pain. Negative for difficulty urinating, dysuria and vaginal discharge.           Objective   /70   Ht 152.4 cm (60\")   Wt 88.9 kg (196 lb)   LMP 02/20/2023 (Exact Date)   BMI 38.28 kg/m²     Physical Exam  Constitutional:       Appearance: Normal appearance. She is well-developed and well-groomed.   Eyes:      General: Lids are normal.      Extraocular Movements: Extraocular movements intact.      Conjunctiva/sclera: Conjunctivae normal.   Genitourinary:     Labia:         Right: No rash, tenderness or lesion.         Left: No rash, tenderness or lesion.       Urethra: No prolapse, urethral pain, urethral swelling or urethral lesion.      Vagina: No vaginal discharge, tenderness or lesions.      Cervix: No cervical motion tenderness, discharge, friability or lesion.   Neurological:      General: No focal deficit present.      Mental Status: She is alert and oriented to person, place, and time.   Psychiatric:         Attention and Perception: Attention normal.         Mood and Affect: Mood normal.         Speech: Speech normal.         Behavior: Behavior is cooperative.            Result Review :           US Non-ob Transvaginal (03/09/2023 11:21)          Assessment and Plan  Diagnoses and all " orders for this visit:    1. Dysmenorrhea (Primary)  -     Cancel: US Non-ob Transvaginal    2. Routine screening for STI (sexually transmitted infection)  -     NuSwab VG+ - Swab, Vagina    3. Encounter for screening for cervical cancer  -     LIQUID-BASED PAP SMEAR WITH HPV GENOTYPING IF ASCUS ([facility]A,COR,MAD)      Patient Instructions   Patient will be contacted with results of Pap and STI screening when available.  Patient is counseled regarding laparoscopic-assisted vaginal hysterectomy and possible conversion to total abdominal hysterectomy given her 3 prior  sections which would put her at increased risk for adhesions.  She is counseled regarding the risks of surgery including, but not limited to, bleeding, infection, damage to pelvic and abdominal organs.             This note was electronically signed.    Tesha Daniel PA-C   2023

## 2023-03-09 NOTE — TELEPHONE ENCOUNTER
Patient informed and appt made for vaginal cultures and TVS.      ----- Message from Cinthya Garcia MD sent at 3/9/2023  8:03 AM EST -----  Okay for patient to schedule but she needs to understand her increased risk for an open procedure as well as risks of surgery.  I would recommend she have cultures however prior to her surgery first as well as TVS.  Thanks.  ----- Message -----  From: Tesha Daniel PA-C  Sent: 3/8/2023   8:40 AM EST  To: MD Dr. Radha Herrera can you please review. patient is wanting hysterectomy for severe dysmenorrhea. She does not want to try any conservative medical options. 3 c/s and tubal. Thanks

## 2023-03-09 NOTE — PATIENT INSTRUCTIONS
Patient will be contacted with results of Pap and STI screening when available.  Patient is counseled regarding laparoscopic-assisted vaginal hysterectomy and possible conversion to total abdominal hysterectomy given her 3 prior  sections which would put her at increased risk for adhesions.  She is counseled regarding the risks of surgery including, but not limited to, bleeding, infection, damage to pelvic and abdominal organs.

## 2023-03-09 NOTE — PROGRESS NOTES
Patient informed pelvic ultrasound earlier today notes normal anteverted uterus.  Bilateral ovaries are normal.  There is a trace amount of free fluid.  We will proceed with scheduling hysterectomy.

## 2023-03-10 PROBLEM — N94.6 DYSMENORRHEA: Status: ACTIVE | Noted: 2023-03-10

## 2023-03-13 LAB
A VAGINAE DNA VAG QL NAA+PROBE: ABNORMAL SCORE
BVAB2 DNA VAG QL NAA+PROBE: ABNORMAL SCORE
C ALBICANS DNA VAG QL NAA+PROBE: NEGATIVE
C GLABRATA DNA VAG QL NAA+PROBE: NEGATIVE
C TRACH DNA VAG QL NAA+PROBE: NEGATIVE
MEGA1 DNA VAG QL NAA+PROBE: ABNORMAL SCORE
N GONORRHOEA DNA VAG QL NAA+PROBE: NEGATIVE
REF LAB TEST METHOD: NORMAL
T VAGINALIS DNA VAG QL NAA+PROBE: NEGATIVE

## 2023-03-15 RX ORDER — METRONIDAZOLE 500 MG/1
500 TABLET ORAL 2 TIMES DAILY
Qty: 14 TABLET | Refills: 0 | Status: SHIPPED | OUTPATIENT
Start: 2023-03-15 | End: 2023-03-22

## 2023-03-15 NOTE — PROGRESS NOTES
Please inform patient swab positive for bacterial vaginosis and I have sent in Flagyl to pharmacy. Pap normal. Thanks

## 2023-03-29 ENCOUNTER — OFFICE VISIT (OUTPATIENT)
Dept: OBSTETRICS AND GYNECOLOGY | Facility: CLINIC | Age: 28
End: 2023-03-29
Payer: COMMERCIAL

## 2023-03-29 VITALS — BODY MASS INDEX: 38.43 KG/M2 | SYSTOLIC BLOOD PRESSURE: 106 MMHG | DIASTOLIC BLOOD PRESSURE: 76 MMHG | WEIGHT: 196.8 LBS

## 2023-03-29 DIAGNOSIS — N94.6 DYSMENORRHEA: Primary | ICD-10-CM

## 2023-03-29 NOTE — PROGRESS NOTES
Subjective   Chief Complaint   Patient presents with   • Consult     Wanting to discuss Hysterectomy     Bessie Padilla is a 27 y.o. year old  (C/S x 3, BTL).  Patient's last menstrual period was 2023 (exact date).  She presents to be seen because of hysterectomy scheduled in the coming month for severe progressive refractory dysmenorrhea..     OTHER COMPLAINTS:  Nothing else    The following portions of the patient's history were reviewed and updated as appropriate:  She  has a past medical history of Anxiety, Asthma, Chlamydia, Depression, Gestational hypertension, Gonorrhea, Multiple gestation (13), Ovarian cyst, Peptic ulceration, PMS (premenstrual syndrome), Polycystic ovary syndrome, Preeclampsia, and Varicella.  She does not have any pertinent problems on file.  She  has a past surgical history that includes No past surgeries;  section with tubal (N/A, 2017); Tubal ligation;  section (2012);  section (2014); and  section ().  Her family history includes Diabetes in her paternal grandmother; Hypertension in her maternal grandmother; Ovarian cancer in her maternal aunt; Stroke in her paternal grandmother.  She  reports that she has been smoking cigarettes and electronic cigarette. She has been smoking an average of .25 packs per day. She has never used smokeless tobacco. She reports current alcohol use. She reports that she does not use drugs.  Current Outpatient Medications   Medication Sig Dispense Refill   • ibuprofen (ADVIL,MOTRIN) 800 MG tablet Take 1 tablet by mouth Every 8 (Eight) Hours As Needed for Mild Pain . 60 tablet 0     No current facility-administered medications for this visit.     Current Outpatient Medications on File Prior to Visit   Medication Sig   • ibuprofen (ADVIL,MOTRIN) 800 MG tablet Take 1 tablet by mouth Every 8 (Eight) Hours As Needed for Mild Pain .     No current facility-administered  medications on file prior to visit.     She is allergic to coconut oil, amoxicillin, and zoloft [sertraline hcl].    Social History    Tobacco Use      Smoking status: Some Days        Packs/day: 0.25        Types: Cigarettes, Electronic Cigarette      Smokeless tobacco: Never    Review of Systems  Consitutional POS: nothing reported    NEG: anorexia or night sweats   Gastointestinal POS: nothing reported    NEG: bloating, change in bowel habits, melena or reflux symptoms   Genitourinary POS: nothing reported    NEG: dysuria or hematuria   Integument POS: nothing reported    NEG: moles that are changing in size, shape, color or rashes   Breast POS: nothing reported    NEG: persistent breast lump, skin dimpling or nipple discharge         Respiratory: negative  Cardiovascular: negative          Objective   /76   Wt 89.3 kg (196 lb 12.8 oz)   LMP 03/20/2023 (Exact Date)   BMI 38.43 kg/m²     General:  well developed; well nourished  no acute distress  obese - Body mass index is 38.43 kg/m².   Skin:  No suspicious lesions seen   Thyroid: normal to inspection and palpation   Lungs:  breathing is unlabored  clear to auscultation bilaterally   Heart:  regular rate and rhythm, S1, S2 normal, no murmur, click, rub or gallop   Breasts:  Not performed.   Abdomen: soft, non-tender; no masses  no umbilical or inguinal hernias are present  no hepato-splenomegaly   Pelvis: Not performed.     Psychiatric: Alert and oriented ×3, mood and affect appropriate  HEENT: Atraumatic, normocephalic, normal scleral icterus  Extremities: 2+ pulses bilaterally, no edema      Lab Review   No data reviewed    Imaging   Pelvic ultrasound report        Assessment   1. Progressive refractory severe dysmenorrhea desiring definitive management     Plan   1. Total laparoscopic hysterectomy/cystoscopy/possible open  2. R/B A    No orders of the defined types were placed in this encounter.         This note was electronically signed.      March  29, 2023

## 2023-04-17 ENCOUNTER — TELEPHONE (OUTPATIENT)
Dept: PREADMISSION TESTING | Facility: HOSPITAL | Age: 28
End: 2023-04-17

## 2023-04-18 ENCOUNTER — PRE-ADMISSION TESTING (OUTPATIENT)
Dept: PREADMISSION TESTING | Facility: HOSPITAL | Age: 28
End: 2023-04-18
Payer: COMMERCIAL

## 2023-04-18 VITALS — WEIGHT: 200 LBS | HEIGHT: 60 IN | BODY MASS INDEX: 39.27 KG/M2

## 2023-04-18 DIAGNOSIS — N94.6 DYSMENORRHEA: ICD-10-CM

## 2023-04-18 LAB
ABO GROUP BLD: NORMAL
BASOPHILS # BLD AUTO: 0.03 10*3/MM3 (ref 0–0.2)
BASOPHILS NFR BLD AUTO: 0.5 % (ref 0–1.5)
BILIRUB UR QL STRIP: NEGATIVE
CLARITY UR: ABNORMAL
COLOR UR: YELLOW
DEPRECATED RDW RBC AUTO: 41.3 FL (ref 37–54)
EOSINOPHIL # BLD AUTO: 0.1 10*3/MM3 (ref 0–0.4)
EOSINOPHIL NFR BLD AUTO: 1.7 % (ref 0.3–6.2)
ERYTHROCYTE [DISTWIDTH] IN BLOOD BY AUTOMATED COUNT: 12.3 % (ref 12.3–15.4)
GLUCOSE UR STRIP-MCNC: NEGATIVE MG/DL
HCT VFR BLD AUTO: 36.7 % (ref 34–46.6)
HGB BLD-MCNC: 12.3 G/DL (ref 12–15.9)
HGB UR QL STRIP.AUTO: NEGATIVE
IMM GRANULOCYTES # BLD AUTO: 0.02 10*3/MM3 (ref 0–0.05)
IMM GRANULOCYTES NFR BLD AUTO: 0.3 % (ref 0–0.5)
KETONES UR QL STRIP: NEGATIVE
LEUKOCYTE ESTERASE UR QL STRIP.AUTO: NEGATIVE
LYMPHOCYTES # BLD AUTO: 2.34 10*3/MM3 (ref 0.7–3.1)
LYMPHOCYTES NFR BLD AUTO: 40.4 % (ref 19.6–45.3)
MCH RBC QN AUTO: 31 PG (ref 26.6–33)
MCHC RBC AUTO-ENTMCNC: 33.5 G/DL (ref 31.5–35.7)
MCV RBC AUTO: 92.4 FL (ref 79–97)
MONOCYTES # BLD AUTO: 0.36 10*3/MM3 (ref 0.1–0.9)
MONOCYTES NFR BLD AUTO: 6.2 % (ref 5–12)
NEUTROPHILS NFR BLD AUTO: 2.94 10*3/MM3 (ref 1.7–7)
NEUTROPHILS NFR BLD AUTO: 50.9 % (ref 42.7–76)
NITRITE UR QL STRIP: NEGATIVE
NRBC BLD AUTO-RTO: 0 /100 WBC (ref 0–0.2)
PH UR STRIP.AUTO: <=5 [PH] (ref 5–8)
PLATELET # BLD AUTO: 242 10*3/MM3 (ref 140–450)
PMV BLD AUTO: 9.7 FL (ref 6–12)
PROT UR QL STRIP: NEGATIVE
RBC # BLD AUTO: 3.97 10*6/MM3 (ref 3.77–5.28)
RH BLD: POSITIVE
SP GR UR STRIP: 1.02 (ref 1–1.03)
UROBILINOGEN UR QL STRIP: ABNORMAL
WBC NRBC COR # BLD: 5.79 10*3/MM3 (ref 3.4–10.8)

## 2023-04-18 PROCEDURE — 86900 BLOOD TYPING SEROLOGIC ABO: CPT

## 2023-04-18 PROCEDURE — 36415 COLL VENOUS BLD VENIPUNCTURE: CPT

## 2023-04-18 PROCEDURE — 81003 URINALYSIS AUTO W/O SCOPE: CPT

## 2023-04-18 PROCEDURE — 86901 BLOOD TYPING SEROLOGIC RH(D): CPT

## 2023-04-18 PROCEDURE — 85025 COMPLETE CBC W/AUTO DIFF WBC: CPT

## 2023-05-08 ENCOUNTER — ANESTHESIA EVENT (OUTPATIENT)
Dept: PERIOP | Facility: HOSPITAL | Age: 28
End: 2023-05-08
Payer: COMMERCIAL

## 2023-05-08 NOTE — H&P
Chief Complaint   Patient presents with   • Consult       Wanting to discuss Hysterectomy      Bessie Padilla is a 27 y.o. year old  (C/S x 3, BTL).  Patient's last menstrual period was 2023 (exact date).  She presents to be seen because of hysterectomy scheduled in the coming month for severe progressive refractory dysmenorrhea..      OTHER COMPLAINTS:  Nothing else     The following portions of the patient's history were reviewed and updated as appropriate:  She  has a past medical history of Anxiety, Asthma, Chlamydia, Depression, Gestational hypertension, Gonorrhea, Multiple gestation (13), Ovarian cyst, Peptic ulceration, PMS (premenstrual syndrome), Polycystic ovary syndrome, Preeclampsia, and Varicella.  She does not have any pertinent problems on file.  She  has a past surgical history that includes No past surgeries;  section with tubal (N/A, 2017); Tubal ligation;  section (2012);  section (2014); and  section ().  Her family history includes Diabetes in her paternal grandmother; Hypertension in her maternal grandmother; Ovarian cancer in her maternal aunt; Stroke in her paternal grandmother.  She  reports that she has been smoking cigarettes and electronic cigarette. She has been smoking an average of .25 packs per day. She has never used smokeless tobacco. She reports current alcohol use. She reports that she does not use drugs.         Current Outpatient Medications   Medication Sig Dispense Refill   • ibuprofen (ADVIL,MOTRIN) 800 MG tablet Take 1 tablet by mouth Every 8 (Eight) Hours As Needed for Mild Pain . 60 tablet 0      No current facility-administered medications for this visit.           Current Outpatient Medications on File Prior to Visit   Medication Sig   • ibuprofen (ADVIL,MOTRIN) 800 MG tablet Take 1 tablet by mouth Every 8 (Eight) Hours As Needed for Mild Pain .      No current  facility-administered medications on file prior to visit.      She is allergic to coconut oil, amoxicillin, and zoloft [sertraline hcl].     Social History    Tobacco Use      Smoking status: Some Days        Packs/day: 0.25        Types: Cigarettes, Electronic Cigarette      Smokeless tobacco: Never     Review of Systems       Consitutional POS: nothing reported     NEG: anorexia or night sweats   Gastointestinal POS: nothing reported     NEG: bloating, change in bowel habits, melena or reflux symptoms   Genitourinary POS: nothing reported     NEG: dysuria or hematuria   Integument POS: nothing reported     NEG: moles that are changing in size, shape, color or rashes   Breast POS: nothing reported     NEG: persistent breast lump, skin dimpling or nipple discharge            Respiratory: negative  Cardiovascular: negative                 Objective      /76   Wt 89.3 kg (196 lb 12.8 oz)   LMP 03/20/2023 (Exact Date)   BMI 38.43 kg/m²      General:  well developed; well nourished  no acute distress  obese - Body mass index is 38.43 kg/m².   Skin:  No suspicious lesions seen   Thyroid: normal to inspection and palpation   Lungs:  breathing is unlabored  clear to auscultation bilaterally   Heart:  regular rate and rhythm, S1, S2 normal, no murmur, click, rub or gallop   Breasts:  Not performed.   Abdomen: soft, non-tender; no masses  no umbilical or inguinal hernias are present  no hepato-splenomegaly   Pelvis: Not performed.      Psychiatric: Alert and oriented ×3, mood and affect appropriate  HEENT: Atraumatic, normocephalic, normal scleral icterus  Extremities: 2+ pulses bilaterally, no edema        Lab Review   No data reviewed     Imaging   Pelvic ultrasound report               Assessment      1. Progressive refractory severe dysmenorrhea desiring definitive management           Plan      1. Total laparoscopic hysterectomy/cystoscopy/possible open  2. R/B A

## 2023-05-08 NOTE — ANESTHESIA PREPROCEDURE EVALUATION
Anesthesia Evaluation     Patient summary reviewed and Nursing notes reviewed   no history of anesthetic complications:  NPO Solid Status: > 8 hours  NPO Liquid Status: > 8 hours           Airway   Mallampati: II  TM distance: >3 FB  Neck ROM: full  possible difficult intubation  Comment: Pregnant, obese    Dental - normal exam     Pulmonary - normal exam   (+) a smoker (quit smoking at beginning of preganancy) Current, asthma (no inhaler for 2-3 years),  (-) pneumonia, shortness of breath, recent URI, sleep apnea (snores), pulmonary embolism    ROS comment: CXR: NAD   Cardiovascular - normal exam  Exercise tolerance: good (4-7 METS)    (-) pacemaker, hypertension, valvular problems/murmurs, past MI, CAD, dysrhythmias, angina, CHF, orthopnea, PND, PAGE, cardiac stents, PVD      Neuro/Psych  (+) psychiatric history Anxiety and Depression,    GI/Hepatic/Renal/Endo    (+) morbid obesity, PUD,      Musculoskeletal (-) negative ROS    Abdominal    Substance History   (+) alcohol use, drug use (marijuana)     OB/GYN    (-)  Pregnant, Preeclampsia and history of pregnancy induced hypertension    Comment:         Other - negative ROS       ROS/Med Hx Other: 12.3/36.7                  Anesthesia Plan    ASA 3     general with block     (Risks and benefits discussed including risk of aspiration, recall and dental damage. All patient questions answered. Will continue with POC.    Bilateral TAP blocks for POPC: Discussed risk of infection, bruising, tenderness at injection site, possible nerve damage and risk of failed block. All patient questions answered. Will continue with POC.  Discussed realistic expectation of postoperative pain management.  Informed consent obtained from patient preoperatively.  )  intravenous induction     Anesthetic plan, risks, benefits, and alternatives have been provided, discussed and informed consent has been obtained with: patient.    Use of blood products discussed with patient  Consented to  blood products.   Plan discussed with CRNA.

## 2023-05-09 ENCOUNTER — ANESTHESIA (OUTPATIENT)
Dept: PERIOP | Facility: HOSPITAL | Age: 28
End: 2023-05-09
Payer: COMMERCIAL

## 2023-05-09 ENCOUNTER — ANESTHESIA EVENT CONVERTED (OUTPATIENT)
Dept: ANESTHESIOLOGY | Facility: HOSPITAL | Age: 28
End: 2023-05-09
Payer: COMMERCIAL

## 2023-05-09 ENCOUNTER — HOSPITAL ENCOUNTER (OUTPATIENT)
Facility: HOSPITAL | Age: 28
Discharge: HOME | End: 2023-05-10
Attending: OBSTETRICS & GYNECOLOGY | Admitting: OBSTETRICS & GYNECOLOGY
Payer: COMMERCIAL

## 2023-05-09 DIAGNOSIS — N94.6 DYSMENORRHEA: ICD-10-CM

## 2023-05-09 LAB
ABO GROUP BLD: NORMAL
B-HCG UR QL: NEGATIVE
BLD GP AB SCN SERPL QL: NEGATIVE
EXPIRATION DATE: NORMAL
INTERNAL NEGATIVE CONTROL: NORMAL
INTERNAL POSITIVE CONTROL: NORMAL
Lab: NORMAL
RH BLD: POSITIVE
T&S EXPIRATION DATE: NORMAL

## 2023-05-09 PROCEDURE — 25010000002 ROPIVACAINE PER 1 MG: Performed by: NURSE ANESTHETIST, CERTIFIED REGISTERED

## 2023-05-09 PROCEDURE — 25010000002 DEXAMETHASONE PER 1 MG: Performed by: NURSE ANESTHETIST, CERTIFIED REGISTERED

## 2023-05-09 PROCEDURE — 25010000002 GENTAMICIN PER 80 MG: Performed by: OBSTETRICS & GYNECOLOGY

## 2023-05-09 PROCEDURE — 81025 URINE PREGNANCY TEST: CPT | Performed by: OBSTETRICS & GYNECOLOGY

## 2023-05-09 PROCEDURE — 25010000002 ONDANSETRON PER 1 MG: Performed by: OBSTETRICS & GYNECOLOGY

## 2023-05-09 PROCEDURE — 25010000002 PROPOFOL 200 MG/20ML EMULSION: Performed by: NURSE ANESTHETIST, CERTIFIED REGISTERED

## 2023-05-09 PROCEDURE — G0378 HOSPITAL OBSERVATION PER HR: HCPCS

## 2023-05-09 PROCEDURE — 25010000002 MIDAZOLAM PER 1MG: Performed by: NURSE ANESTHETIST, CERTIFIED REGISTERED

## 2023-05-09 PROCEDURE — 25010000002 HYDROMORPHONE 1 MG/ML SOLUTION: Performed by: NURSE ANESTHETIST, CERTIFIED REGISTERED

## 2023-05-09 PROCEDURE — 25010000002 FENTANYL CITRATE (PF) 100 MCG/2ML SOLUTION: Performed by: NURSE ANESTHETIST, CERTIFIED REGISTERED

## 2023-05-09 PROCEDURE — 25010000002 HALOPERIDOL LACTATE PER 5 MG: Performed by: NURSE ANESTHETIST, CERTIFIED REGISTERED

## 2023-05-09 PROCEDURE — S0260 H&P FOR SURGERY: HCPCS | Performed by: OBSTETRICS & GYNECOLOGY

## 2023-05-09 PROCEDURE — 25010000002 KETOROLAC TROMETHAMINE PER 15 MG: Performed by: NURSE ANESTHETIST, CERTIFIED REGISTERED

## 2023-05-09 PROCEDURE — 86901 BLOOD TYPING SEROLOGIC RH(D): CPT | Performed by: PHYSICIAN ASSISTANT

## 2023-05-09 PROCEDURE — 86900 BLOOD TYPING SEROLOGIC ABO: CPT | Performed by: PHYSICIAN ASSISTANT

## 2023-05-09 PROCEDURE — 25010000002 SUGAMMADEX 200 MG/2ML SOLUTION: Performed by: NURSE ANESTHETIST, CERTIFIED REGISTERED

## 2023-05-09 PROCEDURE — 88307 TISSUE EXAM BY PATHOLOGIST: CPT

## 2023-05-09 PROCEDURE — 25010000002 HYDROMORPHONE PER 4 MG: Performed by: NURSE ANESTHETIST, CERTIFIED REGISTERED

## 2023-05-09 PROCEDURE — 25010000002 SUCCINYLCHOLINE PER 20 MG: Performed by: NURSE ANESTHETIST, CERTIFIED REGISTERED

## 2023-05-09 PROCEDURE — 86850 RBC ANTIBODY SCREEN: CPT | Performed by: PHYSICIAN ASSISTANT

## 2023-05-09 PROCEDURE — 58571 TLH W/T/O 250 G OR LESS: CPT | Performed by: OBSTETRICS & GYNECOLOGY

## 2023-05-09 PROCEDURE — 25010000002 ONDANSETRON PER 1 MG: Performed by: NURSE ANESTHETIST, CERTIFIED REGISTERED

## 2023-05-09 DEVICE — FLOSEAL HEMOSTATIC MATRIX, 10ML
Type: IMPLANTABLE DEVICE | Site: ABDOMEN | Status: FUNCTIONAL
Brand: FLOSEAL HEMOSTATIC MATRIX

## 2023-05-09 DEVICE — ABSORBABLE RELOAD
Type: IMPLANTABLE DEVICE | Site: ABDOMEN | Status: FUNCTIONAL
Brand: V-LOC 180

## 2023-05-09 RX ORDER — SODIUM CHLORIDE 0.9 % (FLUSH) 0.9 %
10 SYRINGE (ML) INJECTION EVERY 12 HOURS SCHEDULED
Status: DISCONTINUED | OUTPATIENT
Start: 2023-05-09 | End: 2023-05-09 | Stop reason: HOSPADM

## 2023-05-09 RX ORDER — DEXTROSE AND SODIUM CHLORIDE 5; .45 G/100ML; G/100ML
125 INJECTION, SOLUTION INTRAVENOUS CONTINUOUS
Status: DISCONTINUED | OUTPATIENT
Start: 2023-05-09 | End: 2023-05-10 | Stop reason: HOSPADM

## 2023-05-09 RX ORDER — HALOPERIDOL 5 MG/ML
INJECTION INTRAMUSCULAR AS NEEDED
Status: DISCONTINUED | OUTPATIENT
Start: 2023-05-09 | End: 2023-05-09 | Stop reason: SURG

## 2023-05-09 RX ORDER — SUCCINYLCHOLINE CHLORIDE 20 MG/ML
INJECTION INTRAMUSCULAR; INTRAVENOUS AS NEEDED
Status: DISCONTINUED | OUTPATIENT
Start: 2023-05-09 | End: 2023-05-09 | Stop reason: SURG

## 2023-05-09 RX ORDER — PROCHLORPERAZINE EDISYLATE 5 MG/ML
10 INJECTION INTRAMUSCULAR; INTRAVENOUS ONCE AS NEEDED
Status: DISCONTINUED | OUTPATIENT
Start: 2023-05-09 | End: 2023-05-09 | Stop reason: HOSPADM

## 2023-05-09 RX ORDER — SODIUM CHLORIDE, SODIUM LACTATE, POTASSIUM CHLORIDE, CALCIUM CHLORIDE 600; 310; 30; 20 MG/100ML; MG/100ML; MG/100ML; MG/100ML
1000 INJECTION, SOLUTION INTRAVENOUS CONTINUOUS
Status: DISCONTINUED | OUTPATIENT
Start: 2023-05-09 | End: 2023-05-09

## 2023-05-09 RX ORDER — MIDAZOLAM HYDROCHLORIDE 2 MG/2ML
INJECTION, SOLUTION INTRAMUSCULAR; INTRAVENOUS AS NEEDED
Status: DISCONTINUED | OUTPATIENT
Start: 2023-05-09 | End: 2023-05-09 | Stop reason: SURG

## 2023-05-09 RX ORDER — ROPIVACAINE HYDROCHLORIDE 5 MG/ML
INJECTION, SOLUTION EPIDURAL; INFILTRATION; PERINEURAL
Status: DISCONTINUED | OUTPATIENT
Start: 2023-05-09 | End: 2023-05-09 | Stop reason: SURG

## 2023-05-09 RX ORDER — KETOROLAC TROMETHAMINE 30 MG/ML
INJECTION, SOLUTION INTRAMUSCULAR; INTRAVENOUS AS NEEDED
Status: DISCONTINUED | OUTPATIENT
Start: 2023-05-09 | End: 2023-05-09 | Stop reason: SURG

## 2023-05-09 RX ORDER — ONDANSETRON 4 MG/1
4 TABLET, FILM COATED ORAL EVERY 6 HOURS PRN
Status: DISCONTINUED | OUTPATIENT
Start: 2023-05-09 | End: 2023-05-10 | Stop reason: HOSPADM

## 2023-05-09 RX ORDER — HYDROMORPHONE HCL 110MG/55ML
PATIENT CONTROLLED ANALGESIA SYRINGE INTRAVENOUS AS NEEDED
Status: DISCONTINUED | OUTPATIENT
Start: 2023-05-09 | End: 2023-05-09 | Stop reason: SURG

## 2023-05-09 RX ORDER — PHENAZOPYRIDINE HYDROCHLORIDE 100 MG/1
200 TABLET, FILM COATED ORAL ONCE
Status: COMPLETED | OUTPATIENT
Start: 2023-05-09 | End: 2023-05-09

## 2023-05-09 RX ORDER — CEFAZOLIN SODIUM 2 G/50ML
2 SOLUTION INTRAVENOUS ONCE
Status: DISCONTINUED | OUTPATIENT
Start: 2023-05-09 | End: 2023-05-10 | Stop reason: HOSPADM

## 2023-05-09 RX ORDER — OXYCODONE HYDROCHLORIDE 5 MG/1
5 TABLET ORAL EVERY 4 HOURS PRN
Status: DISCONTINUED | OUTPATIENT
Start: 2023-05-09 | End: 2023-05-10 | Stop reason: HOSPADM

## 2023-05-09 RX ORDER — SCOLOPAMINE TRANSDERMAL SYSTEM 1 MG/1
1 PATCH, EXTENDED RELEASE TRANSDERMAL ONCE
Status: DISCONTINUED | OUTPATIENT
Start: 2023-05-09 | End: 2023-05-09

## 2023-05-09 RX ORDER — NALOXONE HCL 0.4 MG/ML
0.1 VIAL (ML) INJECTION
Status: DISCONTINUED | OUTPATIENT
Start: 2023-05-09 | End: 2023-05-09 | Stop reason: SDUPTHER

## 2023-05-09 RX ORDER — SODIUM CHLORIDE 9 MG/ML
40 INJECTION, SOLUTION INTRAVENOUS AS NEEDED
Status: DISCONTINUED | OUTPATIENT
Start: 2023-05-09 | End: 2023-05-09 | Stop reason: HOSPADM

## 2023-05-09 RX ORDER — IPRATROPIUM BROMIDE AND ALBUTEROL SULFATE 2.5; .5 MG/3ML; MG/3ML
3 SOLUTION RESPIRATORY (INHALATION) ONCE AS NEEDED
Status: DISCONTINUED | OUTPATIENT
Start: 2023-05-09 | End: 2023-05-09 | Stop reason: HOSPADM

## 2023-05-09 RX ORDER — SIMETHICONE 80 MG
80 TABLET,CHEWABLE ORAL 4 TIMES DAILY PRN
Status: DISCONTINUED | OUTPATIENT
Start: 2023-05-09 | End: 2023-05-10 | Stop reason: HOSPADM

## 2023-05-09 RX ORDER — FENTANYL CITRATE 50 UG/ML
INJECTION, SOLUTION INTRAMUSCULAR; INTRAVENOUS AS NEEDED
Status: DISCONTINUED | OUTPATIENT
Start: 2023-05-09 | End: 2023-05-09 | Stop reason: SURG

## 2023-05-09 RX ORDER — OXYCODONE HYDROCHLORIDE 5 MG/1
10 TABLET ORAL EVERY 4 HOURS PRN
Status: DISCONTINUED | OUTPATIENT
Start: 2023-05-09 | End: 2023-05-10 | Stop reason: HOSPADM

## 2023-05-09 RX ORDER — ROCURONIUM BROMIDE 10 MG/ML
INJECTION, SOLUTION INTRAVENOUS AS NEEDED
Status: DISCONTINUED | OUTPATIENT
Start: 2023-05-09 | End: 2023-05-09 | Stop reason: SURG

## 2023-05-09 RX ORDER — DEXAMETHASONE SODIUM PHOSPHATE 4 MG/ML
INJECTION, SOLUTION INTRA-ARTICULAR; INTRALESIONAL; INTRAMUSCULAR; INTRAVENOUS; SOFT TISSUE AS NEEDED
Status: DISCONTINUED | OUTPATIENT
Start: 2023-05-09 | End: 2023-05-09 | Stop reason: SURG

## 2023-05-09 RX ORDER — IBUPROFEN 800 MG/1
800 TABLET ORAL EVERY 8 HOURS
Status: DISCONTINUED | OUTPATIENT
Start: 2023-05-09 | End: 2023-05-10 | Stop reason: HOSPADM

## 2023-05-09 RX ORDER — IBUPROFEN 800 MG/1
800 TABLET ORAL EVERY 8 HOURS
Status: DISCONTINUED | OUTPATIENT
Start: 2023-05-10 | End: 2023-05-09

## 2023-05-09 RX ORDER — ONDANSETRON 2 MG/ML
4 INJECTION INTRAMUSCULAR; INTRAVENOUS EVERY 6 HOURS PRN
Status: DISCONTINUED | OUTPATIENT
Start: 2023-05-09 | End: 2023-05-10 | Stop reason: HOSPADM

## 2023-05-09 RX ORDER — ACETAMINOPHEN 500 MG
1000 TABLET ORAL EVERY 8 HOURS
Status: DISCONTINUED | OUTPATIENT
Start: 2023-05-09 | End: 2023-05-10 | Stop reason: HOSPADM

## 2023-05-09 RX ORDER — MAGNESIUM HYDROXIDE 1200 MG/15ML
LIQUID ORAL AS NEEDED
Status: DISCONTINUED | OUTPATIENT
Start: 2023-05-09 | End: 2023-05-09 | Stop reason: HOSPADM

## 2023-05-09 RX ORDER — NALOXONE HCL 0.4 MG/ML
0.1 VIAL (ML) INJECTION
Status: DISCONTINUED | OUTPATIENT
Start: 2023-05-09 | End: 2023-05-10 | Stop reason: HOSPADM

## 2023-05-09 RX ORDER — PROPOFOL 10 MG/ML
INJECTION, EMULSION INTRAVENOUS AS NEEDED
Status: DISCONTINUED | OUTPATIENT
Start: 2023-05-09 | End: 2023-05-09 | Stop reason: SURG

## 2023-05-09 RX ORDER — CLINDAMYCIN PHOSPHATE 900 MG/50ML
900 INJECTION INTRAVENOUS ONCE
Status: COMPLETED | OUTPATIENT
Start: 2023-05-09 | End: 2023-05-09

## 2023-05-09 RX ORDER — SODIUM CHLORIDE 0.9 % (FLUSH) 0.9 %
10 SYRINGE (ML) INJECTION AS NEEDED
Status: DISCONTINUED | OUTPATIENT
Start: 2023-05-09 | End: 2023-05-09 | Stop reason: HOSPADM

## 2023-05-09 RX ORDER — DOCUSATE SODIUM 100 MG/1
100 CAPSULE, LIQUID FILLED ORAL 2 TIMES DAILY PRN
Status: DISCONTINUED | OUTPATIENT
Start: 2023-05-09 | End: 2023-05-10 | Stop reason: HOSPADM

## 2023-05-09 RX ORDER — BISACODYL 10 MG
10 SUPPOSITORY, RECTAL RECTAL DAILY PRN
Status: DISCONTINUED | OUTPATIENT
Start: 2023-05-09 | End: 2023-05-10 | Stop reason: HOSPADM

## 2023-05-09 RX ORDER — ONDANSETRON 2 MG/ML
4 INJECTION INTRAMUSCULAR; INTRAVENOUS ONCE AS NEEDED
Status: DISCONTINUED | OUTPATIENT
Start: 2023-05-09 | End: 2023-05-09 | Stop reason: HOSPADM

## 2023-05-09 RX ORDER — MEPERIDINE HYDROCHLORIDE 25 MG/ML
12.5 INJECTION INTRAMUSCULAR; INTRAVENOUS; SUBCUTANEOUS
Status: DISCONTINUED | OUTPATIENT
Start: 2023-05-09 | End: 2023-05-09 | Stop reason: HOSPADM

## 2023-05-09 RX ORDER — NALOXONE HCL 0.4 MG/ML
0.4 VIAL (ML) INJECTION
Status: DISCONTINUED | OUTPATIENT
Start: 2023-05-09 | End: 2023-05-10 | Stop reason: HOSPADM

## 2023-05-09 RX ORDER — LIDOCAINE HYDROCHLORIDE 20 MG/ML
INJECTION, SOLUTION INTRAVENOUS AS NEEDED
Status: DISCONTINUED | OUTPATIENT
Start: 2023-05-09 | End: 2023-05-09 | Stop reason: SURG

## 2023-05-09 RX ORDER — ONDANSETRON 2 MG/ML
INJECTION INTRAMUSCULAR; INTRAVENOUS AS NEEDED
Status: DISCONTINUED | OUTPATIENT
Start: 2023-05-09 | End: 2023-05-09 | Stop reason: SURG

## 2023-05-09 RX ADMIN — FENTANYL CITRATE 50 MCG: 50 INJECTION INTRAMUSCULAR; INTRAVENOUS at 07:27

## 2023-05-09 RX ADMIN — SUGAMMADEX 200 MG: 100 INJECTION, SOLUTION INTRAVENOUS at 09:04

## 2023-05-09 RX ADMIN — SODIUM CHLORIDE, POTASSIUM CHLORIDE, SODIUM LACTATE AND CALCIUM CHLORIDE 1000 ML: 600; 310; 30; 20 INJECTION, SOLUTION INTRAVENOUS at 06:48

## 2023-05-09 RX ADMIN — GENTAMICIN SULFATE 320 MG: 40 INJECTION, SOLUTION INTRAMUSCULAR; INTRAVENOUS at 07:41

## 2023-05-09 RX ADMIN — ONDANSETRON 4 MG: 2 INJECTION INTRAMUSCULAR; INTRAVENOUS at 08:12

## 2023-05-09 RX ADMIN — HALOPERIDOL LACTATE 0.5 MG: 5 INJECTION, SOLUTION INTRAMUSCULAR at 08:12

## 2023-05-09 RX ADMIN — IBUPROFEN 800 MG: 800 TABLET, FILM COATED ORAL at 15:57

## 2023-05-09 RX ADMIN — LIDOCAINE HYDROCHLORIDE 100 MG: 20 INJECTION, SOLUTION INTRAVENOUS at 07:27

## 2023-05-09 RX ADMIN — ACETAMINOPHEN 1000 MG: 500 TABLET, FILM COATED ORAL at 11:08

## 2023-05-09 RX ADMIN — SIMETHICONE 80 MG: 80 TABLET, CHEWABLE ORAL at 11:09

## 2023-05-09 RX ADMIN — HYDROMORPHONE HYDROCHLORIDE 0.5 MG: 2 INJECTION, SOLUTION INTRAMUSCULAR; INTRAVENOUS; SUBCUTANEOUS at 09:06

## 2023-05-09 RX ADMIN — ROPIVACAINE HYDROCHLORIDE 30 ML: 5 INJECTION, SOLUTION EPIDURAL; INFILTRATION; PERINEURAL at 09:40

## 2023-05-09 RX ADMIN — ONDANSETRON 4 MG: 2 INJECTION INTRAMUSCULAR; INTRAVENOUS at 11:18

## 2023-05-09 RX ADMIN — DOCUSATE SODIUM 100 MG: 100 CAPSULE, LIQUID FILLED ORAL at 15:57

## 2023-05-09 RX ADMIN — PHENAZOPYRIDINE HYDROCHLORIDE 200 MG: 100 TABLET, FILM COATED ORAL at 06:38

## 2023-05-09 RX ADMIN — CLINDAMYCIN PHOSPHATE 900 MG: 900 INJECTION, SOLUTION INTRAVENOUS at 07:29

## 2023-05-09 RX ADMIN — ROCURONIUM BROMIDE 10 MG: 10 INJECTION INTRAVENOUS at 08:39

## 2023-05-09 RX ADMIN — FENTANYL CITRATE 50 MCG: 50 INJECTION INTRAMUSCULAR; INTRAVENOUS at 07:32

## 2023-05-09 RX ADMIN — SODIUM CHLORIDE, POTASSIUM CHLORIDE, SODIUM LACTATE AND CALCIUM CHLORIDE: 600; 310; 30; 20 INJECTION, SOLUTION INTRAVENOUS at 08:19

## 2023-05-09 RX ADMIN — SCOPALAMINE 1 PATCH: 1 PATCH, EXTENDED RELEASE TRANSDERMAL at 07:16

## 2023-05-09 RX ADMIN — DEXTROSE AND SODIUM CHLORIDE 125 ML/HR: 5; 450 INJECTION, SOLUTION INTRAVENOUS at 16:59

## 2023-05-09 RX ADMIN — DEXAMETHASONE SODIUM PHOSPHATE 4 MG: 4 INJECTION, SOLUTION INTRAMUSCULAR; INTRAVENOUS at 08:12

## 2023-05-09 RX ADMIN — MIDAZOLAM HYDROCHLORIDE 2 MG: 1 INJECTION, SOLUTION INTRAMUSCULAR; INTRAVENOUS at 07:24

## 2023-05-09 RX ADMIN — ROCURONIUM BROMIDE 30 MG: 10 INJECTION INTRAVENOUS at 07:32

## 2023-05-09 RX ADMIN — KETOROLAC TROMETHAMINE 15 MG: 30 INJECTION, SOLUTION INTRAMUSCULAR at 09:04

## 2023-05-09 RX ADMIN — DEXTROSE AND SODIUM CHLORIDE 125 ML/HR: 5; 450 INJECTION, SOLUTION INTRAVENOUS at 09:28

## 2023-05-09 RX ADMIN — ACETAMINOPHEN 1000 MG: 500 TABLET, FILM COATED ORAL at 20:09

## 2023-05-09 RX ADMIN — SUCCINYLCHOLINE CHLORIDE 160 MG: 20 INJECTION, SOLUTION INTRAMUSCULAR; INTRAVENOUS at 07:27

## 2023-05-09 RX ADMIN — HYDROMORPHONE HYDROCHLORIDE 0.5 MG: 1 INJECTION, SOLUTION INTRAMUSCULAR; INTRAVENOUS; SUBCUTANEOUS at 09:29

## 2023-05-09 RX ADMIN — ROCURONIUM BROMIDE 10 MG: 10 INJECTION INTRAVENOUS at 07:27

## 2023-05-09 RX ADMIN — PROPOFOL 200 MG: 10 INJECTION, EMULSION INTRAVENOUS at 07:27

## 2023-05-09 RX ADMIN — IBUPROFEN 800 MG: 800 TABLET, FILM COATED ORAL at 23:13

## 2023-05-09 NOTE — ANESTHESIA POSTPROCEDURE EVALUATION
Patient: Bessie Padilla    Procedure Summary     Date: 05/09/23 Room / Location: [facility] OR 2 /  [facility]    Anesthesia Start: 0722 Anesthesia Stop: 0920    Procedure: TOTAL LAPAROSCOPIC HYSTERECTOMY, bilateral salpingectomy, LYSIS OF ADHESIONS AND CYSTOSCOPY (Abdomen) Diagnosis:       Dysmenorrhea      (Dysmenorrhea [N94.6])    Surgeons: Micky Andino MD Provider: Tabatha Bruce CRNA    Anesthesia Type: general ASA Status: 3          Anesthesia Type: general    Vitals  Vitals Value Taken Time   /77 05/09/23 1010   Temp 97.6 °F (36.4 °C) 05/09/23 0915   Pulse 93 05/09/23 1010   Resp 18 05/09/23 0945   SpO2 95 % 05/09/23 1010   Vitals shown include unvalidated device data.        Post Anesthesia Care and Evaluation    Patient location during evaluation: PACU  Patient participation: complete - patient participated  Level of consciousness: awake and alert  Pain score: 2  Pain management: satisfactory to patient    Airway patency: patent  Anesthetic complications: No anesthetic complications  PONV Status: none  Cardiovascular status: acceptable and stable  Respiratory status: acceptable  Hydration status: acceptable    Comments: Vitals signs as noted in nursing documentation as per protocol.

## 2023-05-09 NOTE — ANESTHESIA PROCEDURE NOTES
Peripheral Block      Patient reassessed immediately prior to procedure    Start time: 5/9/2023 9:40 AM  Stop time: 5/9/2023 9:49 AM  Reason for block: at surgeon's request and post-op pain management  Performed by  CRNA/CAA: Sanford George CRNA  Preanesthetic Checklist  Completed: patient identified, IV checked, site marked, risks and benefits discussed, surgical consent, monitors and equipment checked, pre-op evaluation and timeout performed  Prep:  Pt Position: supine  Sterile barriers:gloves, cap, sterile barriers and mask  Prep: ChloraPrep  Patient monitoring: blood pressure monitoring, continuous pulse oximetry and EKG  Procedure    Sedation: no    Guidance:ultrasound guided    ULTRASOUND INTERPRETATION.  Using ultrasound guidance a 21 G gauge needle was placed in close proximity to the nerve, at which point, under ultrasound guidance anesthetic was injected in the area of the nerve and spread of the anesthesia was seen on ultrasound in close proximity thereto.  There were no abnormalities seen on ultrasound; a digital image was taken; and the patient tolerated the procedure with no complications. Images:still images obtained    Laterality:Bilateral  Block Type:TAP  Injection Technique:single-shot  Needle Type:echogenic  Resistance on Injection: none    Medications Used: ropivacaine (NAROPIN) injection 0.5 % - Peripheral Nerve   30 mL - 5/9/2023 9:40:00 AM      Medications  Preservative Free Saline:30ml  Comment:Block Injection: LA dose divided between Right and Left Block  Adjuncts per total volume of LA:    Decadron 10 mg PSF      If required, intravenous sedation was given -- see meds on anesthesia record.    Post Assessment  Injection Assessment: negative aspiration for heme, no paresthesia on injection and incremental injection  Patient Tolerance:comfortable throughout block  Complications:no  Additional Notes  Procedure:      BILATERAL TAP BLOCKS                             Patient analgesia was  achieved with Skin infiltration 2ml Lidocaine or Bupivacaine    The pt was placed in the Supine Position and under Ultrasound guidance, an echogenic or touhy needle was advanced with Normal Saline hydro dissection of tissue.  The Internal Oblique and Transversus Abdominus muscles were visualized.  At or before the aponeurosis of Internal Oblique, the local anesthetic spread was visualized in the Transversus Abdominus Plane. Injection was made incrementally with aspiration every 5 mls.  There was no intravascular injection;  injection pressure was normal; there was no neural injection; and the procedure was completed without difficulty.

## 2023-05-09 NOTE — OP NOTE
Coleman Padilla  : 1995  MRN: [id number]  CSN: [id number]  Date: 2023    Operative Report    TOTAL LAPAROSCOPIC HYSTERECTOMY/bilateral salpingectomy/cystoscopy/lysis of dense adhesion    Pre-op Diagnosis:  Dysmenorrhea [N94.6]   Post-op Diagnosis:  Post-Op Diagnosis Codes:     * Dysmenorrhea [N94.6]   Procedure: Procedure(s):  TOTAL LAPAROSCOPIC HYSTERECTOMY, bilateral salpingectomy, LYSIS OF ADHESIONS AND CYSTOSCOPY   Surgeon: Surgeon(s):  Micky Andino MD   Assist: STAFF  was responsible for performing the following activities: Retraction and Placing Dressing and their skilled assistance was necessary for the success of this case.     Anesthesia: Choice   Estimated Blood Loss: 200 mls   Fluids: See anesthesia and nursing notes   UOP: See anesthesia and nursing notes   Drains: Aldridge   ABx: clindamycin 900 mgs and gentamycin 5MG/KG mgs   Specimens:   Uterus, cervix, tubes   Findings:  Dense omental adhesions from prior  sections.  Anterior serosal adhesions from prior  section.  Normal ovaries.  Normal bladder mucosa bilateral jets of pretty Mustain urine noted from the ureteral orifices   Complications: none   Indications:  Patient 27-year-old G3, P3 prior  x3 and tubal ligation desiring definitive management of refractory severe dysmenorrhea.  Risks benefits and alternatives were discussed and all questions were answered.     Description of Procedure:  After the appropriate time out and after adequate dosing of her anesthesia, the patient was prepped and draped in the usual sterile fashion.  The patient was placed in the dorsal lithotomy position using Patrick stirrups.  A aldridge catheter had been placed in the bladder for drainage during the procedure.  A weighted speculum was placed in the vagina.  The anterior lip of the cervix was grasped with a single tooth tenaculum.  The uterus was sounded.  The cervix was dilated.  Measurements were taken and  the MARTY manipulator was placed in the usual fashion.  An infraumbilical skin incision was made with the knife and a 10 mm trocar was inserted using the Optiview without any complications.  The abdomen was insufflated with CO2 being sure to keep the pressure less than 15 mmHg.  Bilateral ancillary ports were placed with 5 mm port in the right lower quadrant and a 10 mm port in the left lower quadrant with the aid of transillumination and after identification of the inferior epigastric vessels.  The ports were placed under direct visualization without any complications.  The pelvis was explored with the above findings noted.  The ureters were identified bilaterally and noted to be out of the operative fields at all times.  Enseal was used to take down omental adhesions.  The right ovary was grasped and pulled medially.  Utero-ovarian ligament was cauterized transected with the Enseal.  Dissection was carried along the broad ligament to a level distal to the round ligament with the Enseal.  Traction countertraction and sharp dissection was used to create the bladder flap.  Lyse of adhesions was occurred with the harmonic scalpel secondary to patient's prior  sections..  The bladder flap was created.  The uterine vasculature was skeletonized, cauterized and cut with the Harmonic scalpel.  A similar process was repeated on the patient's contralateral side without any complications.  Once adequate dissection and development of the bladder flap had been created and a white fascial plane was noted, circumferential excision of the specimen overlying the MARTY cup in the vagina was carried out and the specimen was amputated, retrieved, and held in the vagina to maintain a pneumoperitoneum.  Bleeding points were cauterized.  The vaginal cuff was closed in a running fashion with #1 barbed Endostitch x 2.  Irrigation and aspiration was carried out.  No bleeding was noted.  The abdomen was released of CO2 and no bleeding  was noted.  Instrumentation was removed.  Full desufflation was then carried out.  The incisions were inspected and noted to be hemostatic with minimal bovie electrocautery.  The incisions were closed with 4-0 nylon in an interrupted fashion.  Dressings were placed.  The aldridge catheter was removed.  The cystoscope was introduced.  The patient was noted to have bilateral ureteral jet flow of pyridium stained urine.  There was no evidence of bladder mucosal injury.  The aldridge catheter was re-anchored.  A vaginal pack was placed.  All instrument and sponge counts were correct at the end of the procedure.  There were no complications.  The patient tolerated the procedure well.  She was taken to the postoperative recovery room in stable condition.      Micky Andino MD   5/9/2023  09:16 EDT

## 2023-05-09 NOTE — ANESTHESIA PROCEDURE NOTES
Airway  Urgency: elective    Date/Time: 5/9/2023 7:28 AM  Airway not difficult    General Information and Staff    Patient location during procedure: OR  CRNA/CAA: Tabatha Bruce CRNA    Indications and Patient Condition  Indications for airway management: airway protection    Preoxygenated: yes  MILS not maintained throughout  Mask difficulty assessment: 1 - vent by mask    Final Airway Details  Final airway type: endotracheal airway      Successful airway: ETT  Cuffed: yes   Successful intubation technique: direct laryngoscopy  Facilitating devices/methods: intubating stylet  Endotracheal tube insertion site: oral  Blade: Michi  Blade size: 3  ETT size (mm): 7.0  Cormack-Lehane Classification: grade I - full view of glottis  Placement verified by: chest auscultation and capnometry   Cuff volume (mL): 5  Measured from: lips  ETT/EBT  to lips (cm): 20  Number of attempts at approach: 1  Assessment: lips, teeth, and gum same as pre-op and atraumatic intubation    Additional Comments  Negative epigastric sounds, Breath sound equal bilaterally with symmetric chest rise and fall

## 2023-05-09 NOTE — PLAN OF CARE
Goal Outcome Evaluation:                 VSS, pt reports pain is minimal and controlled with prescribed pain medication, the pts biggest complaint is the pain due to the vag packing and catheter, pt is tolerating water and crackers and has had appropriate output throughout the day.

## 2023-05-10 VITALS
RESPIRATION RATE: 16 BRPM | SYSTOLIC BLOOD PRESSURE: 99 MMHG | HEART RATE: 87 BPM | TEMPERATURE: 98.2 F | DIASTOLIC BLOOD PRESSURE: 65 MMHG | OXYGEN SATURATION: 97 %

## 2023-05-10 LAB
DEPRECATED RDW RBC AUTO: 39.6 FL (ref 37–54)
ERYTHROCYTE [DISTWIDTH] IN BLOOD BY AUTOMATED COUNT: 11.9 % (ref 12.3–15.4)
HCT VFR BLD AUTO: 34.9 % (ref 34–46.6)
HGB BLD-MCNC: 11.8 G/DL (ref 12–15.9)
MCH RBC QN AUTO: 30.9 PG (ref 26.6–33)
MCHC RBC AUTO-ENTMCNC: 33.8 G/DL (ref 31.5–35.7)
MCV RBC AUTO: 91.4 FL (ref 79–97)
PLATELET # BLD AUTO: 269 10*3/MM3 (ref 140–450)
PMV BLD AUTO: 9.7 FL (ref 6–12)
RBC # BLD AUTO: 3.82 10*6/MM3 (ref 3.77–5.28)
WBC NRBC COR # BLD: 15.28 10*3/MM3 (ref 3.4–10.8)

## 2023-05-10 PROCEDURE — 85027 COMPLETE CBC AUTOMATED: CPT | Performed by: OBSTETRICS & GYNECOLOGY

## 2023-05-10 PROCEDURE — G0378 HOSPITAL OBSERVATION PER HR: HCPCS

## 2023-05-10 RX ORDER — ACETAMINOPHEN 500 MG
1000 TABLET ORAL EVERY 8 HOURS
Qty: 60 TABLET | Refills: 0 | Status: SHIPPED | OUTPATIENT
Start: 2023-05-10

## 2023-05-10 RX ORDER — SIMETHICONE 80 MG
80 TABLET,CHEWABLE ORAL 4 TIMES DAILY PRN
Qty: 30 TABLET | Refills: 0 | Status: SHIPPED | OUTPATIENT
Start: 2023-05-10

## 2023-05-10 RX ORDER — IBUPROFEN 800 MG/1
800 TABLET ORAL EVERY 8 HOURS
Qty: 30 TABLET | Refills: 0 | Status: SHIPPED | OUTPATIENT
Start: 2023-05-10

## 2023-05-10 RX ORDER — PSEUDOEPHEDRINE HCL 30 MG
100 TABLET ORAL 2 TIMES DAILY PRN
Qty: 60 CAPSULE | Refills: 1 | Status: SHIPPED | OUTPATIENT
Start: 2023-05-10

## 2023-05-10 RX ADMIN — ACETAMINOPHEN 1000 MG: 500 TABLET, FILM COATED ORAL at 04:57

## 2023-05-10 RX ADMIN — SIMETHICONE 80 MG: 80 TABLET, CHEWABLE ORAL at 04:57

## 2023-05-10 RX ADMIN — IBUPROFEN 800 MG: 800 TABLET, FILM COATED ORAL at 07:39

## 2023-05-10 NOTE — DISCHARGE SUMMARY
Discharge Summary      Date of Admission: 5/9/2023   Date of Discharge: 5/10/2023   Admitting Diagnosis: Dysmenorrhea [N94.6]   Discharge Diagnosis:  Same as above status post total laparoscopic hysterectomy bilateral salpingectomy, lysis of adhesions, cystoscopy.   Procedures Performed: Procedure(s):  TOTAL LAPAROSCOPIC HYSTERECTOMY, bilateral salpingectomy, LYSIS OF ADHESIONS AND CYSTOSCOPY      Consults: Consults     No orders found for last 30 day(s).         Brief History: Patient is a 27 y.o. female who underwent total laparoscopic hysterectomy, bilateral salpingectomy, lysis of adhesions and cystoscopy for history of severe dysmenorrhea.   Hospital Course:  Patient's postoperative course has been unremarkable.  On day 1 postop the patient was feeling well.  Her pain was well controlled.  She had been ambulating.  She had passed gas.  She was voiding without difficulty.  She was tolerating diet.  She felt ready for discharge day 1 postop.        Pending Studies: Pending Labs     Order Current Status    TISSUE EXAM, [facility] ([facility]) In process           Condition at discharge: good   Discharge Medications:    Discharge Medications      New Medications      Instructions Start Date   acetaminophen 500 MG tablet  Commonly known as: TYLENOL   1,000 mg, Oral, Every 8 Hours      docusate sodium 100 MG capsule   100 mg, Oral, 2 Times Daily PRN      simethicone 80 MG chewable tablet  Commonly known as: MYLICON   80 mg, Oral, 4 Times Daily PRN         Changes to Medications      Instructions Start Date   ibuprofen 800 MG tablet  Commonly known as: ADVIL,MOTRIN  What changed:   · when to take this  · reasons to take this   800 mg, Oral, Every 8 Hours            Discharge Disposition: Home or Self Care   Follow-up: Future Appointments   Date Time Provider Department Center   5/17/2023  9:20 AM Tesha Daniel PA-C [facility] [place] [facility]          Time: Discharge 15 min    Follow up:   Future Appointments   Date  Time Provider Department [place]   5/17/2023  9:20 AM Tesha Daniel PA-C [facility] [facility]       This note has been electronically signed.    Tesha Daniel PA-C   May 10, 2023

## 2023-05-10 NOTE — PROGRESS NOTES
Patient: Bessie Padilla  Procedure(s):  TOTAL LAPAROSCOPIC HYSTERECTOMY, bilateral salpingectomy, LYSIS OF ADHESIONS AND CYSTOSCOPY  Anesthesia type: general    Patient location: Labor and Delivery  Last vitals:   Vitals:    05/10/23 0602   BP: 115/74   Pulse: 78   Resp: 18   Temp: 98.6 °F (37 °C)   SpO2: 98%     Level of consciousness: awake, alert and oriented    Post-anesthesia pain: adequate analgesia  Airway patency: patent  Respiratory: unassisted  Cardiovascular: stable and blood pressure at baseline  Hydration: euvolemic    Anesthetic complications: no     
normal

## 2023-05-10 NOTE — PLAN OF CARE
Goal Outcome Evaluation:              Outcome Evaluation: VSS, I/Os adequate, pain adequately controlled with PO Tylenol and Ibuprofen, patient is resting well between cares.

## 2023-05-11 LAB — REF LAB TEST METHOD: NORMAL

## 2023-05-17 ENCOUNTER — OFFICE VISIT (OUTPATIENT)
Dept: OBSTETRICS AND GYNECOLOGY | Facility: CLINIC | Age: 28
End: 2023-05-17
Payer: COMMERCIAL

## 2023-05-17 VITALS
DIASTOLIC BLOOD PRESSURE: 72 MMHG | BODY MASS INDEX: 38.44 KG/M2 | WEIGHT: 195.8 LBS | SYSTOLIC BLOOD PRESSURE: 112 MMHG | HEIGHT: 60 IN

## 2023-05-17 DIAGNOSIS — Z09 POSTOPERATIVE FOLLOW-UP: Primary | ICD-10-CM

## 2023-05-17 PROCEDURE — 1160F RVW MEDS BY RX/DR IN RCRD: CPT | Performed by: PHYSICIAN ASSISTANT

## 2023-05-17 PROCEDURE — 99024 POSTOP FOLLOW-UP VISIT: CPT | Performed by: PHYSICIAN ASSISTANT

## 2023-05-17 PROCEDURE — 1159F MED LIST DOCD IN RCRD: CPT | Performed by: PHYSICIAN ASSISTANT

## 2023-05-17 NOTE — PROGRESS NOTES
Subjective   Chief Complaint   Patient presents with   • Post-op     8 days post-op TLH, lysis of adhesions, cysto.  C/O hot flashes, shoulder pain       Bessie Padilla is a 27 y.o. year old  presenting to be seen for post op visit  Doing well post procedure  Normal bowel and bladder function  No vaginal bleeding  Pathology benign      Past Medical History:   Diagnosis Date   • Anxiety    • Asthma 2023    As a child   • Chlamydia    • Depression    • Gestational hypertension    • Gonorrhea    • Multiple gestation 14   • Ovarian cyst    • Peptic ulceration    • PMS (premenstrual syndrome)    • Polycystic ovary syndrome    • Preeclampsia    • Varicella         Current Outpatient Medications:   •  acetaminophen (TYLENOL) 500 MG tablet, Take 2 tablets by mouth Every 8 (Eight) Hours., Disp: 60 tablet, Rfl: 0  •  docusate sodium 100 MG capsule, Take 1 capsule by mouth 2 (Two) Times a Day As Needed for Constipation., Disp: 60 capsule, Rfl: 1  •  ibuprofen (ADVIL,MOTRIN) 800 MG tablet, Take 1 tablet by mouth Every 8 (Eight) Hours., Disp: 30 tablet, Rfl: 0  •  simethicone (MYLICON) 80 MG chewable tablet, Chew 1 tablet 4 (Four) Times a Day As Needed for Flatulence., Disp: 30 tablet, Rfl: 0   Allergies   Allergen Reactions   • Amoxicillin Itching     Difficulty swallowing   • Coconut Oil Anaphylaxis   • Zoloft [Sertraline Hcl] Hives      Past Surgical History:   Procedure Laterality Date   •  SECTION  2012   •  SECTION  2014   •  SECTION     •  SECTION WITH TUBAL N/A 2017    Procedure:  SECTION REPEAT WITH TUBAL;  Surgeon: Cinthya Garcia MD;  Location: [facility] OR;  Service:    • NO PAST SURGERIES     • TOTAL LAPAROSCOPIC HYSTERECTOMY N/A 2023    Procedure: TOTAL LAPAROSCOPIC HYSTERECTOMY, bilateral salpingectomy, LYSIS OF ADHESIONS AND CYSTOSCOPY;  Surgeon: Micky Andino MD;  Location: [facility] OR;  Service:  "Obstetrics/Gynecology;  Laterality: N/A;   • TUBAL ABDOMINAL LIGATION        Social History     Socioeconomic History   • Marital status:    Tobacco Use   • Smoking status: Some Days     Packs/day: 0.25     Types: Cigarettes, Electronic Cigarette   • Smokeless tobacco: Never   Vaping Use   • Vaping Use: Every day   • Substances: Nicotine, Flavoring   • Devices: Disposable   Substance and Sexual Activity   • Alcohol use: Yes     Comment: rarely   • Drug use: Yes     Frequency: 7.0 times per week     Types: Marijuana     Comment: Last use 4/17/23   • Sexual activity: Defer     Birth control/protection: Hysterectomy     Comment: She has a , and reports they have a girlfriend.      Family History   Problem Relation Age of Onset   • Hypertension Maternal Grandmother    • Stroke Paternal Grandmother    • Diabetes Paternal Grandmother    • Ovarian cancer Maternal Aunt        Review of Systems   Constitutional: Negative for chills, diaphoresis and fever.   Gastrointestinal: Negative for constipation, diarrhea, nausea and vomiting.   Genitourinary: Negative for difficulty urinating, dysuria, pelvic pain and vaginal bleeding.           Objective   /72   Ht 152.4 cm (60\")   Wt 88.8 kg (195 lb 12.8 oz)   LMP 03/20/2023 (Exact Date)   BMI 38.24 kg/m²     Physical Exam  Constitutional:       Appearance: Normal appearance. She is well-developed and well-groomed.   Eyes:      General: Lids are normal.      Extraocular Movements: Extraocular movements intact.      Conjunctiva/sclera: Conjunctivae normal.   Abdominal:      General: There is no distension.      Palpations: Abdomen is soft.      Tenderness: There is no abdominal tenderness.      Comments: Incisions healing well   Neurological:      General: No focal deficit present.      Mental Status: She is alert and oriented to person, place, and time.   Psychiatric:         Attention and Perception: Attention normal.         Mood and Affect: Mood normal.    "      Speech: Speech normal.         Behavior: Behavior is cooperative.            Result Review :                   Assessment and Plan  Diagnoses and all orders for this visit:    1. Postoperative follow-up (Primary)      Patient Instructions   Continue light activity  Avoid driving for one more week  RTO 5 weeks or prn             This note was electronically signed.    Tesha Daniel PA-C   May 17, 2023

## 2023-05-23 ENCOUNTER — HOSPITAL ENCOUNTER (EMERGENCY)
Facility: HOSPITAL | Age: 28
Discharge: HOME | End: 2023-05-23
Attending: EMERGENCY MEDICINE | Admitting: EMERGENCY MEDICINE
Payer: COMMERCIAL

## 2023-05-23 VITALS
HEIGHT: 65 IN | WEIGHT: 190.8 LBS | DIASTOLIC BLOOD PRESSURE: 85 MMHG | HEART RATE: 99 BPM | OXYGEN SATURATION: 100 % | TEMPERATURE: 98.4 F | BODY MASS INDEX: 31.79 KG/M2 | RESPIRATION RATE: 16 BRPM | SYSTOLIC BLOOD PRESSURE: 143 MMHG

## 2023-05-23 DIAGNOSIS — T14.8XXA WOUND INFECTION: ICD-10-CM

## 2023-05-23 DIAGNOSIS — L08.9 WOUND INFECTION: ICD-10-CM

## 2023-05-23 DIAGNOSIS — N39.0 URINARY TRACT INFECTION WITHOUT HEMATURIA, SITE UNSPECIFIED: Primary | ICD-10-CM

## 2023-05-23 LAB
BACTERIA UR QL AUTO: ABNORMAL /HPF
BILIRUB UR QL STRIP: NEGATIVE
CLARITY UR: ABNORMAL
COD CRY URNS QL: ABNORMAL /HPF
COLOR UR: YELLOW
GLUCOSE UR STRIP-MCNC: NEGATIVE MG/DL
HGB UR QL STRIP.AUTO: ABNORMAL
HYALINE CASTS UR QL AUTO: ABNORMAL /LPF
KETONES UR QL STRIP: ABNORMAL
LEUKOCYTE ESTERASE UR QL STRIP.AUTO: ABNORMAL
NITRITE UR QL STRIP: NEGATIVE
PH UR STRIP.AUTO: 5.5 [PH] (ref 5–8)
PROT UR QL STRIP: ABNORMAL
RBC # UR STRIP: ABNORMAL /HPF
REF LAB TEST METHOD: ABNORMAL
SP GR UR STRIP: 1.03 (ref 1–1.03)
SQUAMOUS #/AREA URNS HPF: ABNORMAL /HPF
UROBILINOGEN UR QL STRIP: ABNORMAL
WBC # UR STRIP: ABNORMAL /HPF

## 2023-05-23 PROCEDURE — 99283 EMERGENCY DEPT VISIT LOW MDM: CPT

## 2023-05-23 PROCEDURE — 81001 URINALYSIS AUTO W/SCOPE: CPT | Performed by: EMERGENCY MEDICINE

## 2023-05-23 PROCEDURE — 87086 URINE CULTURE/COLONY COUNT: CPT | Performed by: EMERGENCY MEDICINE

## 2023-05-23 RX ORDER — CEPHALEXIN 250 MG/1
500 CAPSULE ORAL ONCE
Status: COMPLETED | OUTPATIENT
Start: 2023-05-23 | End: 2023-05-23

## 2023-05-23 RX ORDER — PHENAZOPYRIDINE HYDROCHLORIDE 100 MG/1
100 TABLET, FILM COATED ORAL 3 TIMES DAILY PRN
Qty: 6 TABLET | Refills: 0 | Status: SHIPPED | OUTPATIENT
Start: 2023-05-23

## 2023-05-23 RX ORDER — BACITRACIN ZINC 500 [USP'U]/G
1 OINTMENT TOPICAL ONCE
Status: COMPLETED | OUTPATIENT
Start: 2023-05-23 | End: 2023-05-23

## 2023-05-23 RX ORDER — CEPHALEXIN 500 MG/1
500 CAPSULE ORAL 2 TIMES DAILY
Qty: 14 CAPSULE | Refills: 0 | Status: SHIPPED | OUTPATIENT
Start: 2023-05-23

## 2023-05-23 RX ADMIN — BACITRACIN ZINC 1 APPLICATION: 500 OINTMENT TOPICAL at 01:41

## 2023-05-23 RX ADMIN — CEPHALEXIN 500 MG: 250 CAPSULE ORAL at 01:41

## 2023-05-23 NOTE — ED PROVIDER NOTES
TRIAGE CHIEF COMPLAINT:     Nursing and triage notes reviewed    Chief Complaint   Patient presents with   • Post-op Problem   • Wound Check   • Dysuria      HPI: Bessie Padilla is a 27 y.o. female who presents to the emergency department complaining of possible wound infection and also a urinary infection.  Patient had a hysterectomy 3 weeks ago laparoscopically.  She states one of the wounds in her right abdomen seems to have dehisced and has been draining some yellow material.  She states she has had dysuria for the past 4 days.  She denies fevers or chills.    REVIEW OF SYSTEMS: All other systems reviewed and are negative     PAST MEDICAL HISTORY:   Past Medical History:   Diagnosis Date   • Anxiety    • Asthma 04/18/2023    As a child   • Chlamydia    • Depression    • Gestational hypertension    • Gonorrhea    • Multiple gestation 9/11/12 08/19/14 01/26/17   • Ovarian cyst    • Peptic ulceration    • PMS (premenstrual syndrome)    • Polycystic ovary syndrome    • Preeclampsia    • Varicella         FAMILY HISTORY:   Family History   Problem Relation Age of Onset   • Hypertension Maternal Grandmother    • Stroke Paternal Grandmother    • Diabetes Paternal Grandmother    • Ovarian cancer Maternal Aunt         SOCIAL HISTORY:   Social History     Socioeconomic History   • Marital status:    Tobacco Use   • Smoking status: Some Days     Packs/day: 0.25     Types: Cigarettes, Electronic Cigarette   • Smokeless tobacco: Never   Vaping Use   • Vaping Use: Every day   • Substances: Nicotine, Flavoring   • Devices: Disposable   Substance and Sexual Activity   • Alcohol use: Yes     Comment: rarely   • Drug use: Yes     Frequency: 7.0 times per week     Types: Marijuana     Comment: Last use 4/17/23   • Sexual activity: Defer     Birth control/protection: Hysterectomy     Comment: She has a , and reports they have a girlfriend.        SURGICAL HISTORY:   Past Surgical History:   Procedure Laterality  Date   •  SECTION  2012   •  SECTION  2014   •  SECTION  2017   •  SECTION WITH TUBAL N/A 2017    Procedure:  SECTION REPEAT WITH TUBAL;  Surgeon: Cinthya Garcia MD;  Location: [facility];  Service:    • NO PAST SURGERIES     • TOTAL LAPAROSCOPIC HYSTERECTOMY N/A 2023    Procedure: TOTAL LAPAROSCOPIC HYSTERECTOMY, bilateral salpingectomy, LYSIS OF ADHESIONS AND CYSTOSCOPY;  Surgeon: Micky Andino MD;  Location: [facility];  Service: Obstetrics/Gynecology;  Laterality: N/A;   • TUBAL ABDOMINAL LIGATION          CURRENT MEDICATIONS:      Medication List      ASK your doctor about these medications    Acetaminophen Extra Strength 500 MG tablet  Generic drug: acetaminophen  Take 2 tablets by mouth Every 8 (Eight) Hours.     docusate sodium 100 MG capsule  Commonly known as: COLACE  Take 1 capsule by mouth 2 (Two) Times a Day As Needed for Constipation.     Gas Relief 80 MG chewable tablet  Generic drug: simethicone  Chew 1 tablet 4 (Four) Times a Day As Needed for Flatulence.     ibuprofen 800 MG tablet  Commonly known as: ADVIL,MOTRIN  Take 1 tablet by mouth Every 8 (Eight) Hours.             ALLERGIES: Amoxicillin, Coconut oil, and Zoloft [sertraline hcl]     PHYSICAL EXAM:   VITAL SIGNS:   Vitals:    23 0042   BP: 143/85   Pulse: 105   Resp: 18   Temp: 98.4 °F (36.9 °C)   SpO2: 98%      CONSTITUTIONAL: Awake, oriented, appears nontoxic   HENT: Atraumatic, normocephalic, oral mucosa pink and moist, airway patent. Nares patent without drainage. External ears normal.   EYES: Conjunctivae clear   NECK: Trachea midline   CARDIOVASCULAR: Normal heart rate, Normal rhythm, No murmurs, rubs, gallops   PULMONARY/CHEST: Clear to auscultation, no rhonchi, wheezes, or rales. Symmetrical breath sounds.  ABDOMINAL: Nondistended, soft, nontender - no rebound or guarding.  Small laparoscopic incision in the right mid abdomen is slightly gaping.  There is a  small amount of purulent material.  There is no deep abscess appreciated.  Other incisions are healing well.  NEUROLOGIC: Nonfocal, moving all four extremities, no gross sensory or motor deficits.   EXTREMITIES: No clubbing, cyanosis, or edema   SKIN: Warm, Dry, No erythema, No rash     ED COURSE / MEDICAL DECISION MAKING:   Bessie Padilla is a 27 y.o. female who presents to the emergency department for evaluation of wound infection as well as a possible urinary infection.  Patient is nondistressed on arrival in the emergency department.  Vital signs are stable.  Physical examination does reveal small dehiscence of right abdominal wound with small amount of purulent drainage.  There does not appear to be any large abscess and no further purulent material was able to be expressed.    Differential diagnosis includes abscess, cellulitis, urinary infection among other etiologies.    Urinalysis was ordered for further evaluation of the patient's presentation.    Diagnostic information from other sources: Chart review, reviewed operative notes    Interventions: Keflex, bacitracin    Narrative: Patient presents with dysuria and wound dehiscence.  Small wound in the right mid abdomen is slightly gaping with some purulent drainage.  There is no deep abscess on physical examination.  I suspect minor topical infection.  Urinalysis is indicative of infection.  Started patient on antibiotics to cover both skin infection and urinalysis.    Re-evaluation: Patient is resting comfortably    Plan for disposition is discharge    DECISION TO DISCHARGE/ADMIT: see ED care timeline     FINAL IMPRESSION:   1 --urinary tract infection  2 --wound infection  3 --     Electronically signed by: Patty Sesay MD, 5/23/2023 01:36 Patty Boswell MD  05/23/23 0141

## 2023-05-24 LAB — BACTERIA SPEC AEROBE CULT: NORMAL

## 2023-08-20 ENCOUNTER — HOSPITAL ENCOUNTER (EMERGENCY)
Facility: HOSPITAL | Age: 28
Discharge: HOME | End: 2023-08-20
Attending: EMERGENCY MEDICINE
Payer: COMMERCIAL

## 2023-08-20 VITALS
HEIGHT: 65 IN | BODY MASS INDEX: 32.46 KG/M2 | OXYGEN SATURATION: 98 % | HEART RATE: 93 BPM | TEMPERATURE: 98.1 F | SYSTOLIC BLOOD PRESSURE: 125 MMHG | DIASTOLIC BLOOD PRESSURE: 92 MMHG | RESPIRATION RATE: 20 BRPM | WEIGHT: 194.8 LBS

## 2023-08-20 DIAGNOSIS — N30.91 HEMORRHAGIC CYSTITIS: Primary | ICD-10-CM

## 2023-08-20 LAB
B-HCG UR QL: NEGATIVE
BACTERIA UR QL AUTO: ABNORMAL /HPF
BILIRUB UR QL STRIP: NEGATIVE
CLARITY UR: ABNORMAL
COLOR UR: YELLOW
GLUCOSE UR STRIP-MCNC: NEGATIVE MG/DL
HGB UR QL STRIP.AUTO: ABNORMAL
HYALINE CASTS UR QL AUTO: ABNORMAL /LPF
KETONES UR QL STRIP: NEGATIVE
LEUKOCYTE ESTERASE UR QL STRIP.AUTO: ABNORMAL
NITRITE UR QL STRIP: NEGATIVE
PH UR STRIP.AUTO: <=5 [PH] (ref 5–8)
PROT UR QL STRIP: ABNORMAL
RBC # UR STRIP: ABNORMAL /HPF
REF LAB TEST METHOD: ABNORMAL
SP GR UR STRIP: 1.02 (ref 1–1.03)
SQUAMOUS #/AREA URNS HPF: ABNORMAL /HPF
UROBILINOGEN UR QL STRIP: ABNORMAL
WBC # UR STRIP: ABNORMAL /HPF

## 2023-08-20 PROCEDURE — 87591 N.GONORRHOEAE DNA AMP PROB: CPT | Performed by: EMERGENCY MEDICINE

## 2023-08-20 PROCEDURE — 99283 EMERGENCY DEPT VISIT LOW MDM: CPT

## 2023-08-20 PROCEDURE — 87491 CHLMYD TRACH DNA AMP PROBE: CPT | Performed by: EMERGENCY MEDICINE

## 2023-08-20 PROCEDURE — 81001 URINALYSIS AUTO W/SCOPE: CPT

## 2023-08-20 PROCEDURE — 81025 URINE PREGNANCY TEST: CPT | Performed by: EMERGENCY MEDICINE

## 2023-08-20 RX ORDER — NITROFURANTOIN 25; 75 MG/1; MG/1
100 CAPSULE ORAL ONCE
Status: COMPLETED | OUTPATIENT
Start: 2023-08-20 | End: 2023-08-20

## 2023-08-20 RX ORDER — PHENAZOPYRIDINE HYDROCHLORIDE 200 MG/1
200 TABLET, FILM COATED ORAL 3 TIMES DAILY PRN
Qty: 6 TABLET | Refills: 0 | Status: SHIPPED | OUTPATIENT
Start: 2023-08-20

## 2023-08-20 RX ORDER — NITROFURANTOIN 25; 75 MG/1; MG/1
100 CAPSULE ORAL 2 TIMES DAILY
Qty: 14 CAPSULE | Refills: 0 | Status: SHIPPED | OUTPATIENT
Start: 2023-08-20

## 2023-08-20 RX ORDER — PHENAZOPYRIDINE HYDROCHLORIDE 100 MG/1
200 TABLET, FILM COATED ORAL ONCE
Status: COMPLETED | OUTPATIENT
Start: 2023-08-20 | End: 2023-08-20

## 2023-08-20 RX ADMIN — PHENAZOPYRIDINE HYDROCHLORIDE 200 MG: 100 TABLET, FILM COATED ORAL at 01:35

## 2023-08-20 RX ADMIN — NITROFURANTOIN MONOHYDRATE/MACROCRYSTALLINE 100 MG: 25; 75 CAPSULE ORAL at 01:34

## 2023-08-21 NOTE — ED PROVIDER NOTES
Subjective  History of Present Illness:    Chief Complaint: Frequent urination, burning sensation, hematuria, wants checked for STI    History of Present Illness: 28-year-old female with dysuria urgency frequency hematuria, has multiple sexual partners and wants to be tested for STIs.  No vaginal discharge no reported vaginal lesions    Nurses Notes reviewed and agree, including vitals, allergies, social history and prior medical history.     REVIEW OF SYSTEMS: All systems reviewed and not pertinent unless noted.  Review of Systems      Positive for: Frequency burning with urination, hematuria    Negative for: Pain fever vomiting abdominal pain    Past Medical History:   Diagnosis Date    Anxiety     Asthma 2023    As a child    Chlamydia     Depression     Gestational hypertension     Gonorrhea     Multiple gestation 13    Ovarian cyst     Peptic ulceration     PMS (premenstrual syndrome)     Polycystic ovary syndrome     Preeclampsia     Varicella        Allergies:    Amoxicillin, Coconut (cocos nucifera), and Zoloft [sertraline hcl]      Past Surgical History:   Procedure Laterality Date     SECTION  2012     SECTION  2014     SECTION       SECTION WITH TUBAL N/A 2017    Procedure:  SECTION REPEAT WITH TUBAL;  Surgeon: Cinthya Garcia MD;  Location: [facility];  Service:     NO PAST SURGERIES      TOTAL LAPAROSCOPIC HYSTERECTOMY N/A 2023    Procedure: TOTAL LAPAROSCOPIC HYSTERECTOMY, bilateral salpingectomy, LYSIS OF ADHESIONS AND CYSTOSCOPY;  Surgeon: Micky Andino MD;  Location: [facility];  Service: Obstetrics/Gynecology;  Laterality: N/A;    TUBAL ABDOMINAL LIGATION           Social History     Socioeconomic History    Marital status:    Tobacco Use    Smoking status: Former     Packs/day: 0.25     Types: Cigarettes, Electronic Cigarette    Smokeless tobacco: Never   Vaping Use    Vaping Use: Every day  "   Substances: Nicotine, Flavoring    Devices: Disposable   Substance and Sexual Activity    Alcohol use: Yes     Comment: rarely    Drug use: Yes     Frequency: 7.0 times per week     Types: Marijuana     Comment: Last use 4/17/23    Sexual activity: Defer     Birth control/protection: Hysterectomy     Comment: She has a , and reports they have a girlfriend.         Family History   Problem Relation Age of Onset    Hypertension Maternal Grandmother     Stroke Paternal Grandmother     Diabetes Paternal Grandmother     Ovarian cancer Maternal Aunt        Objective  Physical Exam:  /92 (BP Location: Left arm, Patient Position: Sitting)   Pulse 93   Temp 98.1 øF (36.7 øC) (Oral)   Resp 20   Ht 165.1 cm (65\")   Wt 88.4 kg (194 lb 12.8 oz)   LMP 03/15/2023 (Exact Date)   SpO2 98%   BMI 32.42 kg/mý      Physical Exam    CONSTITUTIONAL: Well developed, nontoxic 28-year-old female,  in no acute distress.  VITAL SIGNS: per nursing, reviewed and noted  SKIN: exposed skin with no rashes, ulcerations or petechiae  EYES: Grossly EOMI, no icterus  ENT: Normal voice.  Moist mucous membranes   RESPIRATORY:  No increased work of breathing. No retractions.   CARDIOVASCULAR:   Extremities pink and warm.  Good cap refill to extremities.   GI: Abdomen without distention   MUSCULOSKELETAL: Age-appropriate bulk and tone, moves all 4 extremities  NEUROLOGIC: Alert, oriented x 3. No gross deficits. GCS 15.   PSYCH: appropriate affect.  : no bladder tenderness or distention, no CVA tenderness    Procedures    ED Course:    Lab Results (last 24 hours)       ** No results found for the last 24 hours. **             No radiology results from the last 24 hrs     MDM     Amount and/or Complexity of Data Reviewed  Clinical lab tests: reviewed             Medical Decision Making:    Initial impression of presenting illness: 28-year-old female with dysuria urgency frequency hematuria, has multiple sexual partners and wants to " be tested for STIs      DDX: includes but is not limited to: UTI         Patient arrives normotensive afebrile nontachycardic sats 98% with vitals interpreted by myself.     Pertinent features from physical exam: Benign exam.    Initial diagnostic plan: UA and pregnancy, added gonorrhea and Chlamydia testing at patient request    Results from initial plan were reviewed and interpreted by me revealing UA with too numerous to count red cells and white cells 1+ bacteria moderate leukocytes negative nitrite    Diagnostic information from other sources: None    Interventions / Re-evaluation: Pyridium and Macrobid    Results/clinical rationale were discussed with patient    Consultations/Discussion of results with other physicians: None    Disposition plan: Patient was discharged in home stable condition.  Counseled on supportive care, outpatient follow-up. Return precaution discussed.  Patient/family was understanding and agreeable with plan  Gonorrhea chlamydia pending  -----      Final diagnoses:   Hemorrhagic cystitis            Jhonatan Alvarenga,   08/21/23 0532

## 2023-08-22 LAB
C TRACH RRNA SPEC QL NAA+PROBE: NEGATIVE
N GONORRHOEA RRNA SPEC QL NAA+PROBE: NEGATIVE

## 2024-02-26 DIAGNOSIS — R39.9 UTI SYMPTOMS: Primary | ICD-10-CM

## 2024-02-26 RX ORDER — SULFAMETHOXAZOLE AND TRIMETHOPRIM 800; 160 MG/1; MG/1
1 TABLET ORAL 2 TIMES DAILY
Qty: 14 TABLET | Refills: 0 | Status: SHIPPED | OUTPATIENT
Start: 2024-02-26

## 2024-04-18 ENCOUNTER — HOSPITAL ENCOUNTER (EMERGENCY)
Facility: HOSPITAL | Age: 29
Discharge: HOME | End: 2024-04-18
Attending: EMERGENCY MEDICINE
Payer: COMMERCIAL

## 2024-04-18 VITALS
OXYGEN SATURATION: 97 % | DIASTOLIC BLOOD PRESSURE: 97 MMHG | BODY MASS INDEX: 36.71 KG/M2 | TEMPERATURE: 98.4 F | SYSTOLIC BLOOD PRESSURE: 149 MMHG | HEIGHT: 60 IN | RESPIRATION RATE: 20 BRPM | WEIGHT: 187 LBS | HEART RATE: 82 BPM

## 2024-04-18 DIAGNOSIS — Z20.2 STD EXPOSURE: Primary | ICD-10-CM

## 2024-04-18 LAB
B-HCG UR QL: NEGATIVE
BILIRUB UR QL STRIP: NEGATIVE
CLARITY UR: ABNORMAL
COLOR UR: YELLOW
GLUCOSE UR STRIP-MCNC: NEGATIVE MG/DL
HGB UR QL STRIP.AUTO: NEGATIVE
KETONES UR QL STRIP: ABNORMAL
KOH PREP NAIL: NORMAL
LEUKOCYTE ESTERASE UR QL STRIP.AUTO: NEGATIVE
NITRITE UR QL STRIP: NEGATIVE
PH UR STRIP.AUTO: 5.5 [PH] (ref 5–8)
PROT UR QL STRIP: NEGATIVE
SP GR UR STRIP: 1.03 (ref 1–1.03)
UROBILINOGEN UR QL STRIP: ABNORMAL

## 2024-04-18 PROCEDURE — 87220 TISSUE EXAM FOR FUNGI: CPT | Performed by: EMERGENCY MEDICINE

## 2024-04-18 PROCEDURE — 87591 N.GONORRHOEAE DNA AMP PROB: CPT | Performed by: EMERGENCY MEDICINE

## 2024-04-18 PROCEDURE — 81025 URINE PREGNANCY TEST: CPT | Performed by: EMERGENCY MEDICINE

## 2024-04-18 PROCEDURE — 87491 CHLMYD TRACH DNA AMP PROBE: CPT | Performed by: EMERGENCY MEDICINE

## 2024-04-18 PROCEDURE — 99283 EMERGENCY DEPT VISIT LOW MDM: CPT

## 2024-04-18 PROCEDURE — 81003 URINALYSIS AUTO W/O SCOPE: CPT | Performed by: EMERGENCY MEDICINE

## 2024-04-18 PROCEDURE — 63710000001 ONDANSETRON ODT 4 MG TABLET DISPERSIBLE: Performed by: EMERGENCY MEDICINE

## 2024-04-18 RX ORDER — DOXYCYCLINE 100 MG/1
100 CAPSULE ORAL ONCE
Status: COMPLETED | OUTPATIENT
Start: 2024-04-18 | End: 2024-04-18

## 2024-04-18 RX ORDER — ONDANSETRON 4 MG/1
8 TABLET, ORALLY DISINTEGRATING ORAL ONCE
Status: COMPLETED | OUTPATIENT
Start: 2024-04-18 | End: 2024-04-18

## 2024-04-18 RX ORDER — METRONIDAZOLE 500 MG/1
2000 TABLET ORAL ONCE
Status: COMPLETED | OUTPATIENT
Start: 2024-04-18 | End: 2024-04-18

## 2024-04-18 RX ORDER — DOXYCYCLINE 100 MG/1
100 CAPSULE ORAL 2 TIMES DAILY
Qty: 14 CAPSULE | Refills: 0 | OUTPATIENT
Start: 2024-04-18 | End: 2024-04-20

## 2024-04-18 RX ORDER — FLUCONAZOLE 100 MG/1
200 TABLET ORAL ONCE
Status: COMPLETED | OUTPATIENT
Start: 2024-04-18 | End: 2024-04-18

## 2024-04-18 RX ADMIN — FLUCONAZOLE 200 MG: 100 TABLET ORAL at 22:54

## 2024-04-18 RX ADMIN — METRONIDAZOLE 2000 MG: 500 TABLET ORAL at 22:54

## 2024-04-18 RX ADMIN — DOXYCYCLINE 100 MG: 100 CAPSULE ORAL at 22:54

## 2024-04-18 RX ADMIN — ONDANSETRON 8 MG: 4 TABLET, ORALLY DISINTEGRATING ORAL at 22:54

## 2024-04-19 NOTE — DISCHARGE INSTRUCTIONS
Have all of your sexual partners tested and treated before engaging in any further sexual intercourse.  Take your antibiotic prescription to completion.  Return to the emergency department for any abdominal pain, fever, vomiting, inability to take your antibiotics or for any concerning symptoms or new concerns.

## 2024-04-19 NOTE — ED PROVIDER NOTES
EMERGENCY DEPARTMENT ENCOUNTER    Pt Name: Bessie Padilla  MRN: [id number]  Pt :   1995  Room Number:  24SF/24  Date of encounter:  2024  PCP: Cinthya Lozada APRN  ED Provider: Micky Jiménez MD    Historian: Patient      HPI:  Chief Complaint: Vaginal discharge        Context: Bessie Padilla is a 28 y.o. female who presents to the ED c/o vaginal discharge.  Patient says that her boyfriend tested positive for chlamydia.  She says that she has had it previously and this feels similar as to when she had it before.  She says she is having white vaginal discharge and some right flank pain.  She denies having any abdominal pain.  She denies having fever, nausea or vomiting.  She denies dysuria, polyuria or urinary hesitancy.  She denies vaginal bleeding.      PAST MEDICAL HISTORY  Past Medical History:   Diagnosis Date    Anxiety     Asthma 2023    As a child    Chlamydia     Depression     Gestational hypertension     Gonorrhea     Multiple gestation 13    Ovarian cyst     Peptic ulceration     PMS (premenstrual syndrome)     Polycystic ovary syndrome     Preeclampsia     Varicella          PAST SURGICAL HISTORY  Past Surgical History:   Procedure Laterality Date     SECTION  2012     SECTION  2014     SECTION       SECTION WITH TUBAL N/A 2017    Procedure:  SECTION REPEAT WITH TUBAL;  Surgeon: Cinthya Garcia MD;  Location: [facility];  Service:     NO PAST SURGERIES      TOTAL LAPAROSCOPIC HYSTERECTOMY N/A 2023    Procedure: TOTAL LAPAROSCOPIC HYSTERECTOMY, bilateral salpingectomy, LYSIS OF ADHESIONS AND CYSTOSCOPY;  Surgeon: Micky Andino MD;  Location: [facility] OR;  Service: Obstetrics/Gynecology;  Laterality: N/A;    TUBAL ABDOMINAL LIGATION           FAMILY HISTORY  Family History   Problem Relation Age of Onset    Hypertension Maternal Grandmother     Stroke Paternal Grandmother      Diabetes Paternal Grandmother     Ovarian cancer Maternal Aunt          SOCIAL HISTORY  Social History     Socioeconomic History    Marital status:    Tobacco Use    Smoking status: Former     Current packs/day: 0.25     Types: Cigarettes, Electronic Cigarette    Smokeless tobacco: Never   Vaping Use    Vaping status: Every Day    Substances: Nicotine, Flavoring    Devices: Disposable   Substance and Sexual Activity    Alcohol use: Yes     Comment: rarely    Drug use: Yes     Frequency: 7.0 times per week     Types: Marijuana     Comment: Last use 4/17/23    Sexual activity: Yes     Partners: Male     Birth control/protection: Hysterectomy     Comment: She has a , and reports they have a girlfriend.         ALLERGIES  Amoxicillin, Coconut (cocos nucifera), and Zoloft [sertraline hcl]        REVIEW OF SYSTEMS    All systems reviewed and negative except for those discussed in HPI.       PHYSICAL EXAM    I have reviewed the triage vital signs and nursing notes.    ED Triage Vitals [04/18/24 2140]   Temp Heart Rate Resp BP SpO2   98.4 °F (36.9 °C) 82 20 149/97 97 %      Temp src Heart Rate Source Patient Position BP Location FiO2 (%)   Oral Monitor Sitting Left arm --         General: no acute distress, well-appearing, non-toxic  Skin: normal color, warm and dry  Head: normocephalic, atraumatic  Nose: normal nasal mucosa, no visible deformity.  Mouth: moist mucous membranes.  Neck: supple.  Chest: no retractions, no visible deformity  Cardiovascular: Regular rate and rhythm.  Lungs: clear to auscultation bilaterally.  Abdomen: soft, non-tender, non-distended. No rebound tenderness, no guarding.  No peritonitis.  Specifically, no tenderness to palpation whatsoever in the bilateral lower abdomen.  Neuro:  alert and oriented x3, no focal neurological deficits.  Psych:  appropriate mood and behavior.        LAB RESULTS  Recent Results (from the past 24 hour(s))   Urinalysis With Culture If Indicated - Urine,  Clean Catch    Collection Time: 04/18/24  9:54 PM    Specimen: Urine, Clean Catch   Result Value Ref Range    Color, UA Yellow Yellow, Straw    Appearance, UA Cloudy (A) Clear    pH, UA 5.5 5.0 - 8.0    Specific Gravity, UA 1.028 1.005 - 1.030    Glucose, UA Negative Negative    Ketones, UA Trace (A) Negative    Bilirubin, UA Negative Negative    Blood, UA Negative Negative    Protein, UA Negative Negative    Leuk Esterase, UA Negative Negative    Nitrite, UA Negative Negative    Urobilinogen, UA 0.2 E.U./dL 0.2 - 1.0 E.U./dL   Pregnancy, Urine - Urine, Clean Catch    Collection Time: 04/18/24  9:54 PM    Specimen: Urine, Clean Catch   Result Value Ref Range    HCG, Urine QL Negative Negative   KOH Prep - Urine, Urine, Clean Catch    Collection Time: 04/18/24  9:54 PM    Specimen: Urine, Clean Catch   Result Value Ref Range    KOH Prep No yeast or hyphal elements seen No yeast or hyphal elements seen       If labs were ordered, I independently reviewed the results and considered them in treating the patient.  See medical decision making discussion section for my interpretation of lab results.        RADIOLOGY  No Radiology Exams Resulted Within Past 24 Hours        PROCEDURES    Procedures    No orders to display       MEDICATIONS GIVEN IN ER    Medications   doxycycline (MONODOX) capsule 100 mg (has no administration in time range)   fluconazole (DIFLUCAN) tablet 200 mg (has no administration in time range)   metroNIDAZOLE (FLAGYL) tablet 2,000 mg (has no administration in time range)   ondansetron ODT (ZOFRAN-ODT) disintegrating tablet 8 mg (has no administration in time range)         MEDICAL DECISION MAKING, PROGRESS, and CONSULTS    All labs, if obtained, have been independently reviewed by me.  All radiology studies, if obtained, have been reviewed by me and the radiologist dictating the report.  All EKG's, if obtained, have been independently viewed and interpreted by me/my attending physician.       Discussion below represents my analysis of pertinent findings related to patient's condition, differential diagnosis, treatment plan and final disposition.                         Differential diagnosis:    Differential diagnosis for this patient includes hepatitis, cholangitis, cholecystitis, pancreatitis, gastritis, enteritis, colitis, gastroenteritis, appendicitis, volvulus, obstruction, ischemia, torsion, cystitis, pyelonephritis, nephrolithiasis, uretolithiasis, cervicitis, PID, other acute emergency.  Patient reports hysterectomy.    Medical Decision Making Discussion:    Patient vitals are reviewed and are normal.    I doubt intra-abdominal surgical emergency as her abdomen is soft and completely nontender with no peritoneal signs.  She specifically has no tenderness palpation whatsoever in the bilateral lower abdomen.    Urinalysis is negative for infection.    Given patient's reported exposure to chlamydia with vaginal discharge she will be empirically treated for chlamydia.  Chlamydia and gonorrhea test are a send out.  I will withhold treatment for gonorrhea at this time as she reports anaphylaxis to amoxicillin.  Patient is given p.o. doxycycline, p.o. Flagyl and Diflucan to cover for any other potential coinfection's.    Patient discharged home with a prescription for doxycycline.  She was instructed to have all sexual partners tested and treated before engaging in any further sexual intercourse.  She is instructed to return emerged apartment for abdominal pain, fever, vomiting, inability to take her antibiotics or for any concerning symptoms or new concerns.      Shared Decision Making:  After my consideration of clinical presentation and any laboratory/radiology studies obtained, I discussed the findings with the patient/patient representative who is in agreement with the treatment plan and the final disposition.   Risks and benefits of discharge and/or observation/admission were  discussed.    Orders placed during this visit:  Orders Placed This Encounter   Procedures    Chlamydia trachomatis, Neisseria gonorrhoeae, PCR - Urine, Urine, Clean Catch    KOH Prep - Urine, Urine, Clean Catch    Urinalysis With Culture If Indicated -    Pregnancy, Urine - Urine, Clean Catch         Additional orders considered but not ordered:  Consider radiographic imaging of the abdomen however this is deferred as the patient's abdomen is soft and completely nontender with no peritoneal signs.        AS OF 22:54 EDT VITALS:    BP - 149/97  HR - 82  TEMP - 98.4 °F (36.9 °C) (Oral)  O2 SATS - 97%                  DIAGNOSIS  Final diagnoses:   STD exposure         DISPOSITION  Discharge      Please note that portions of this document were completed with voice recognition software.        Micky Jiménez MD  04/18/24 1135

## 2024-04-22 LAB
C TRACH RRNA SPEC QL NAA+PROBE: NEGATIVE
N GONORRHOEA RRNA SPEC QL NAA+PROBE: NEGATIVE

## 2024-05-01 ENCOUNTER — TELEMEDICINE (OUTPATIENT)
Dept: FAMILY MEDICINE CLINIC | Facility: TELEHEALTH | Age: 29
End: 2024-05-01
Payer: COMMERCIAL

## 2024-05-01 VITALS — HEIGHT: 60 IN | BODY MASS INDEX: 35.93 KG/M2 | WEIGHT: 183 LBS

## 2024-05-01 DIAGNOSIS — N39.0 URINARY TRACT INFECTION WITHOUT HEMATURIA, SITE UNSPECIFIED: Primary | ICD-10-CM

## 2024-05-01 PROCEDURE — 99213 OFFICE O/P EST LOW 20 MIN: CPT | Performed by: NURSE PRACTITIONER

## 2024-05-01 RX ORDER — NITROFURANTOIN 25; 75 MG/1; MG/1
100 CAPSULE ORAL 2 TIMES DAILY
Qty: 14 CAPSULE | Refills: 0 | Status: SHIPPED | OUTPATIENT
Start: 2024-05-01 | End: 2024-05-08

## 2024-05-01 NOTE — PROGRESS NOTES
You have chosen to receive care through a telehealth visit.  Do you consent to use a video/audio connection for your medical care today? Yes     HPI  Bessie Padilla is a 28 y.o. female  presents with complaint of urinary problem. She reports that her symptoms started yesterday but have worsened today. They are noted in the ROS portion of this visit. She does report a history of urinary tact infections. She has not taken anything over the counter for her symptoms.    Review of Systems   Constitutional:  Negative for chills, fatigue and fever.   Gastrointestinal:  Negative for diarrhea and nausea.   Genitourinary:  Positive for dysuria, frequency and urgency. Negative for flank pain, genital sores, hematuria and vaginal discharge.        Urine smells stronger       Past Medical History:   Diagnosis Date    Anxiety     Asthma 04/18/2023    As a child    Chlamydia     Depression     Gestational hypertension     Gonorrhea     Multiple gestation 9/11/12 08/19/14 01/26/17    Ovarian cyst     Peptic ulceration     PMS (premenstrual syndrome)     Polycystic ovary syndrome     Preeclampsia     Varicella        Family History   Problem Relation Age of Onset    Hypertension Maternal Grandmother     Stroke Paternal Grandmother     Diabetes Paternal Grandmother     Ovarian cancer Maternal Aunt        Social History     Socioeconomic History    Marital status:    Tobacco Use    Smoking status: Former     Current packs/day: 0.25     Types: Cigarettes, Electronic Cigarette    Smokeless tobacco: Never   Vaping Use    Vaping status: Every Day    Substances: Nicotine, Flavoring    Devices: Disposable   Substance and Sexual Activity    Alcohol use: Yes     Comment: rarely    Drug use: Yes     Frequency: 7.0 times per week     Types: Marijuana     Comment: Last use 4/17/23    Sexual activity: Yes     Partners: Male     Birth control/protection: Hysterectomy     Comment: She has a , and reports they have a girlfriend.  "      Bessie Padilla has quit smoking cigarettes. She does vape. She has been advised to quit. She does not dip.     Ht 152.4 cm (60\")   Wt 83 kg (183 lb)   LMP 03/15/2023 (Exact Date)   BMI 35.74 kg/m²     PHYSICAL EXAM  Physical Exam   Constitutional: She is oriented to person, place, and time. She appears well-developed.   HENT:   Head: Normocephalic and atraumatic.   Nose: Nose normal.   Eyes: Lids are normal. Right eye exhibits no discharge. Left eye exhibits no discharge. Right conjunctiva is not injected. Left conjunctiva is not injected.   Pulmonary/Chest:  No respiratory distress.  Abdominal: There is no abdominal tenderness.   Neurological: She is alert and oriented to person, place, and time. No cranial nerve deficit.   Psychiatric: She has a normal mood and affect. Her speech is normal and behavior is normal. Judgment and thought content normal.       Results for orders placed or performed during the hospital encounter of 04/20/24   Chlamydia trachomatis, Neisseria gonorrhoeae, Trichomonas vaginalis, PCR - Swab, Vagina    Specimen: Vagina; Swab   Result Value Ref Range    Chlamydia trachomatis, NORMA Negative Negative    Gonococcus by NORMA Negative Negative    Trichomonas vaginosis Positive (A) Negative       Diagnoses and all orders for this visit:    1. Urinary tract infection without hematuria, site unspecified (Primary)    Other orders  -     nitrofurantoin, macrocrystal-monohydrate, (MACROBID) 100 MG capsule; Take 1 capsule by mouth 2 (Two) Times a Day for 7 days.  Dispense: 14 capsule; Refill: 0    Probiotics for two weeks related to taking antibiotics. The pharmacist can help you with this if needed. Do not take within two hours of antibiotic.  Drink plenty of of clear decaffeinated fluids    FOLLOW-UP  If symptoms worsen or persist follow up with PCP, [facility] or Urgent Care    Patient verbalizes understanding of medication dosage, comfort measures, instructions for treatment and " follow-up.    Raiza Coto, APRN  05/01/2024  08:48 EDT    The use of a video visit has been reviewed with the patient and verbal informed consent has been obtained. Myself and Bessie Padilla participated in this visit. The patient is located in [address] [place] [address].    I am located in [place], [place]. [employer] and [employer] were utilized. I spent 23 minutes in the patient's chart for this visit.

## 2025-05-07 ENCOUNTER — TRANSCRIBE ORDERS (OUTPATIENT)
Dept: ADMINISTRATIVE | Facility: HOSPITAL | Age: 30
End: 2025-05-07
Payer: COMMERCIAL

## (undated) DEVICE — PROXIMATE SKIN STAPLERS (35 WIDE) CONTAINS 35 STAINLESS STEEL STAPLES (FIXED HEAD): Brand: PROXIMATE

## (undated) DEVICE — SLV SCD CALF HEMOFORCE DVT THERP REPROC MD

## (undated) DEVICE — LARGE, DISPOSABLE ALEXIS O C-SECTION PROTECTOR - RETRACTOR: Brand: ALEXIS ® O C-SECTION PROTECTOR - RETRACTOR

## (undated) DEVICE — JELLY,LUBE,STERILE,FLIP TOP,TUBE,2-OZ: Brand: MEDLINE

## (undated) DEVICE — SOL IRR H2O BTL 1000ML STRL

## (undated) DEVICE — HDRST POSTN SLOTTED A/

## (undated) DEVICE — MANIP UTER ADVINCULA DELINEATOR 3.0CM

## (undated) DEVICE — HARMONIC 1100 SHEARS, 36CM SHAFT LENGTH: Brand: HARMONIC

## (undated) DEVICE — ENDOPATH XCEL BLADELESS TROCARS WITH STABILITY SLEEVES: Brand: ENDOPATH XCEL

## (undated) DEVICE — ENSEAL X1 TISSUE SEALER, CURVED JAW, 37 CM SHAFT LENGTH: Brand: ENSEAL

## (undated) DEVICE — SUTURING DEVICE: Brand: ENDO STITCH

## (undated) DEVICE — MARKR SKIN W/RULR

## (undated) DEVICE — SUT GUT CHRM 2/0 SH 27IN G123H

## (undated) DEVICE — PENCL E/S HNDSWCH PUSHBTN HOLSTR 10FT

## (undated) DEVICE — SUT GUT CHRM 1 CTX 36IN 905H

## (undated) DEVICE — PAD STEEP TRENDELENBURG W/RAIL STRAP INTEGR ARM PROTECT WING

## (undated) DEVICE — DRSNG TELFA PAD NONADH STR 1S 3X8IN

## (undated) DEVICE — TBG PENCL TELESCP MEGADYNE SMOKE EVAC 10FT

## (undated) DEVICE — SOL OPTHALMIC BETADINE 5P 30ML STRL

## (undated) DEVICE — SUT PDS 0 CT 36IN DYED Z358T

## (undated) DEVICE — DISPOSABLE MONOPOLAR ENDOSCOPIC CORD 10 FT. (3M): Brand: KIRWAN

## (undated) DEVICE — KT ANTI FOG W/FLD AND SPNG

## (undated) DEVICE — SPNG LAP 18X18IN LF STRL PK/5

## (undated) DEVICE — TAPE MICROFM 2IN LF

## (undated) DEVICE — SYR LUERLOK 50ML

## (undated) DEVICE — SUTURE GUT CHROMIC 3/0 912H

## (undated) DEVICE — 2, DISPOSABLE SUCTION/IRRIGATOR WITHOUT DISPOSABLE TIP: Brand: STRYKEFLOW

## (undated) DEVICE — SPNG GZ WOVN 4X4IN 12PLY 10/BX STRL

## (undated) DEVICE — RICH LAVH: Brand: MEDLINE INDUSTRIES, INC.

## (undated) DEVICE — PREMIUM WET SKIN PREP TRAY CHG: Brand: MEDLINE INDUSTRIES, INC.

## (undated) DEVICE — DRP ADAPT ALLY UTER POSTN SYS 1P/U

## (undated) DEVICE — GLV SURG BIOGEL M LTX PF 8

## (undated) DEVICE — RICH C-SECTION: Brand: MEDLINE INDUSTRIES, INC.

## (undated) DEVICE — SOL NACL 0.9PCT 1000ML

## (undated) DEVICE — 4-PORT MANIFOLD: Brand: NEPTUNE 2

## (undated) DEVICE — PAD,ABDOMINAL,8"X10",ST,LF: Brand: MEDLINE

## (undated) DEVICE — OPN ALLPURP 5/IN/1

## (undated) DEVICE — SUT VIC 0 CT 36IN J958H

## (undated) DEVICE — ST IRR CYSTO W/SPK 77IN LF

## (undated) DEVICE — CUP EXTRCT VAC

## (undated) DEVICE — APPL HEMOS FOR DELIVERY FLOSEAL

## (undated) DEVICE — GOWN,SIRUS,NON REINFRCD,LARGE,SET IN SL: Brand: MEDLINE

## (undated) DEVICE — SUT ETHLN 4/0 PS2 PLSTC 1667G

## (undated) DEVICE — GLV SURG BIOGEL M LTX PF 6 1/2